# Patient Record
Sex: MALE | Race: BLACK OR AFRICAN AMERICAN | NOT HISPANIC OR LATINO | Employment: OTHER | URBAN - METROPOLITAN AREA
[De-identification: names, ages, dates, MRNs, and addresses within clinical notes are randomized per-mention and may not be internally consistent; named-entity substitution may affect disease eponyms.]

---

## 2018-06-19 ENCOUNTER — TELEPHONE (OUTPATIENT)
Dept: VASCULAR SURGERY | Facility: CLINIC | Age: 68
End: 2018-06-19

## 2018-06-19 DIAGNOSIS — I73.9 PAD (PERIPHERAL ARTERY DISEASE): Primary | ICD-10-CM

## 2018-06-19 NOTE — TELEPHONE ENCOUNTER
----- Message from Srinivas Serna sent at 6/19/2018  1:57 PM CDT -----  Contact: [facility]/sheila  [phone]//caller states that she sent a referral over for pt to see dr berger and hasnt heard anything back as of today//please call/thank you

## 2018-07-26 ENCOUNTER — TELEPHONE (OUTPATIENT)
Dept: VASCULAR SURGERY | Facility: CLINIC | Age: 68
End: 2018-07-26

## 2018-07-26 NOTE — TELEPHONE ENCOUNTER
----- Message from Robson Gonzalez sent at 7/26/2018  1:28 PM CDT -----   Pt called stating he need to speak with nurse to reschedule an appt.    Please call [phone] regarding this

## 2018-08-09 ENCOUNTER — HOSPITAL ENCOUNTER (OUTPATIENT)
Dept: VASCULAR SURGERY | Facility: CLINIC | Age: 68
Discharge: HOME | End: 2018-08-09
Attending: SURGERY
Payer: MEDICARE

## 2018-08-09 ENCOUNTER — OFFICE VISIT (OUTPATIENT)
Dept: VASCULAR SURGERY | Facility: CLINIC | Age: 68
End: 2018-08-09
Payer: MEDICARE

## 2018-08-09 VITALS
WEIGHT: 180.56 LBS | HEIGHT: 68 IN | BODY MASS INDEX: 27.36 KG/M2 | TEMPERATURE: 98 F | RESPIRATION RATE: 18 BRPM | HEART RATE: 56 BPM | SYSTOLIC BLOOD PRESSURE: 113 MMHG | DIASTOLIC BLOOD PRESSURE: 68 MMHG

## 2018-08-09 DIAGNOSIS — I82.401 ACUTE DEEP VEIN THROMBOSIS (DVT) OF RIGHT LOWER EXTREMITY, UNSPECIFIED VEIN: ICD-10-CM

## 2018-08-09 DIAGNOSIS — I87.2 VENOUS INSUFFICIENCY OF BOTH LOWER EXTREMITIES: Primary | ICD-10-CM

## 2018-08-09 DIAGNOSIS — I71.40 ABDOMINAL AORTIC ANEURYSM (AAA) WITHOUT RUPTURE: ICD-10-CM

## 2018-08-09 DIAGNOSIS — I73.9 PAD (PERIPHERAL ARTERY DISEASE): ICD-10-CM

## 2018-08-09 DIAGNOSIS — I73.9 PAD (PERIPHERAL ARTERY DISEASE): Primary | ICD-10-CM

## 2018-08-09 PROCEDURE — 99999 PR PBB SHADOW E&M-EST. PATIENT-LVL III: CPT | Mod: PBBFAC,,, | Performed by: SURGERY

## 2018-08-09 PROCEDURE — 99204 OFFICE O/P NEW MOD 45 MIN: CPT | Mod: S$PBB,,, | Performed by: SURGERY

## 2018-08-09 PROCEDURE — 93923 UPR/LXTR ART STDY 3+ LVLS: CPT | Mod: 26,S$PBB,, | Performed by: SURGERY

## 2018-08-09 PROCEDURE — 99213 OFFICE O/P EST LOW 20 MIN: CPT | Mod: PBBFAC,25 | Performed by: SURGERY

## 2018-08-09 PROCEDURE — 93923 UPR/LXTR ART STDY 3+ LVLS: CPT | Mod: PBBFAC | Performed by: SURGERY

## 2018-08-09 RX ORDER — VITAMIN A 3000 MCG
10000 CAPSULE ORAL DAILY
COMMUNITY
End: 2020-06-24

## 2018-08-09 RX ORDER — PNV NO.95/FERROUS FUM/FOLIC AC 28MG-0.8MG
1000 TABLET ORAL
COMMUNITY
End: 2020-06-24

## 2018-08-09 RX ORDER — ZINC GLUCONATE 50 MG
50 TABLET ORAL
COMMUNITY
End: 2020-06-24

## 2018-08-09 RX ORDER — LEVOTHYROXINE SODIUM 88 UG/1
TABLET ORAL
COMMUNITY
Start: 2017-01-14 | End: 2019-09-05

## 2018-08-09 NOTE — LETTER
August 9, 2018      Ines Hurt MD  [id number]  [place] [address]           [facility] - Vascular Surgery  [address]  [facility] [address]  Phone: [phone]  Fax: [phone]          Patient: Jhon Garvey Sr   MR Number: [id number]   YOB: 1950   Date of Visit: 8/9/2018       Dear Dr. Ines Hurt:    Thank you for referring Jhon Garvey Sr to me for evaluation. Attached you will find relevant portions of my assessment and plan of care.    If you have questions, please do not hesitate to call me. I look forward to following Jhon Garvey Sr along with you.    Sincerely,    John Morin MD    Enclosure  CC:  No Recipients    If you would like to receive this communication electronically, please contact [email or web address] or [phone] to request more information on [facility] access.    For providers and/or their staff who would like to refer a patient to [facility], please contact us through our one-stop-shop provider referral line, [facility], at [phone].    If you feel you have received this communication in error or would no longer like to receive these types of communications, please e-mail [email or web address]

## 2018-08-09 NOTE — PROGRESS NOTES
Jhon Garvey Sr  08/09/2018    HPI:  Patient is a 68 y.o. male with a h/o hypothyroidism, venous ulcers, DVT of R leg (?March/April 2018 - per patient at [facility]; in [place]) who is here today for evaluation of venous stasis ulcer in the L medial malleolus region.   States in June 2018 had a f/u u/s and no longer DVT.  Wound Care clinic in [place] (asked to see by Dr Ines Hurt)  The ulcer has been treated in wound clinic w/ wound vac, compression, and topical agents. These treatments have been effective, and patient reports the ulcer is nearly completely gone. Ulcer is mildly painful currently. Patient reports intermittent claudication and some mild rest pain. Patient also has some stasis dermatitis bilaterally. He also has a previous, healed ulcer on the R lateral ankle that was successfully treated by wound care team.    No history of MI/stroke  Tobacco use: Former smoker, 3 pack years. Quit in his 20s    Retired from [employer] in 2002    Past Medical History:   Diagnosis Date    Complete rotator cuff tear of left shoulder     MRI 3/11/15    Thyroid disease      Past Surgical History:   Procedure Laterality Date    SHOULDER ARTHROSCOPY  4/3/15    (L) scope w/arch decompression     Family History   Problem Relation Age of Onset    Diabetes Mother      Social History     Social History    Marital status:      Spouse name: N/A    Number of children: N/A    Years of education: N/A     Occupational History    Not on file.     Social History Main Topics    Smoking status: Never Smoker    Smokeless tobacco: Not on file    Alcohol use No    Drug use: No    Sexual activity: Not on file     Other Topics Concern    Not on file     Social History Narrative    No narrative on file       Current Outpatient Prescriptions:     levothyroxine (SYNTHROID) 88 MCG tablet, Take 88 mcg by mouth., Disp: , Rfl:     vitamin A 75144 UNIT capsule, Take 10,000 Units by mouth once daily., Disp: , Rfl:      zinc gluconate 50 mg tablet, Take 50 mg by mouth., Disp: , Rfl:     levothyroxine (SYNTHROID) 88 MCG tablet, , Disp: , Rfl:     vitamin E 1000 UNIT capsule, Take 1,000 Units by mouth., Disp: , Rfl:   -Ensure plus      REVIEW OF SYSTEMS:  General: negative; ENT: negative; Allergy and Immunology: negative; Hematological and Lymphatic: Negative; Endocrine: negative; Respiratory: no cough, shortness of breath, or wheezing; Cardiovascular: no chest pain or dyspnea on exertion; Gastrointestinal: no abdominal pain/back, change in bowel habits, or bloody stools; Genito-Urinary: no dysuria, trouble voiding, or hematuria; Musculoskeletal: negative  Neurological: no TIA or stroke symptoms; Psychiatric: no nervousness, anxiety or depression.    PHYSICAL EXAM:   Right Arm BP - Sittin/69 (18 1318)  Left Arm BP - Sittin/68 (18 1318)  Pulse: (!) 56  Temp: 97.5 °F (36.4 °C)      General appearance:  Alert, well-appearing, and in no distress.  Oriented to person, place, and time   Neurological: Normal speech, no focal findings noted; CN II - XII grossly intact           Musculoskeletal: Digits/nail without cyanosis/clubbing.  Normal muscle strength/tone.                 Neck: Supple, no significant adenopathy; thyroid is not enlarged                  No carotid bruit can be auscultated                Chest:  Clear to auscultation, no wheezes, rales or rhonchi, symmetric air entry     No use of accessory muscles             Cardiac: Normal rate and regular rhythm, S1 and S2 normal; PMI non-displaced          Abdomen: Soft, nontender, nondistended, no masses or organomegaly     No rebound tenderness noted; bowel sounds normal     Pulsatile aortic mass is palpable.     No groin adenopathy      Extremities:   2+ femoral pulses bilaterally     2+ DPs pedal pulses palpable.     L > R pre-tibial edema     L medial malleolar ~ 3-4mm ulcerations    LAB RESULTS:  No results found for: K, CREATININE  No results  found for: WBC, HCT, PLT  No results found for: HGBA1C  IMAGING:  PVRs: triphasic waveforms x2  ABIs: n/c    Previous -- none to review  DVT of R leg (?March/April 2018 - per patient at [facility]; in [place])  States in June 2018 had a f/u u/s and no longer DVT.    IMP/PLAN:  68 y.o. male with h/o hypothyroidism with Chronic venous insufficiency - CEAP C6 L leg -- -clinically has venous insufficiency  No PAD except for non-compressible  Denies previous ulcer on R leg; states he was never told he had a L leg DVT    Check evlt u/s and RTC  Still unclear why he had a R leg DVT: Needs hypercoagulable w/u  Check AAA us    John Morin MD FACS  Vascular/Endovascular Surgery

## 2018-08-30 ENCOUNTER — OFFICE VISIT (OUTPATIENT)
Dept: VASCULAR SURGERY | Facility: CLINIC | Age: 68
End: 2018-08-30
Payer: MEDICARE

## 2018-08-30 ENCOUNTER — HOSPITAL ENCOUNTER (OUTPATIENT)
Dept: VASCULAR SURGERY | Facility: CLINIC | Age: 68
Discharge: HOME | End: 2018-08-30
Attending: SURGERY
Payer: MEDICARE

## 2018-08-30 VITALS
WEIGHT: 178.56 LBS | HEART RATE: 63 BPM | SYSTOLIC BLOOD PRESSURE: 125 MMHG | DIASTOLIC BLOOD PRESSURE: 76 MMHG | TEMPERATURE: 98 F | HEIGHT: 68 IN | BODY MASS INDEX: 27.06 KG/M2

## 2018-08-30 DIAGNOSIS — I71.40 ABDOMINAL AORTIC ANEURYSM (AAA) WITHOUT RUPTURE: ICD-10-CM

## 2018-08-30 DIAGNOSIS — I87.2 VENOUS INSUFFICIENCY OF BOTH LOWER EXTREMITIES: ICD-10-CM

## 2018-08-30 DIAGNOSIS — I73.9 PAD (PERIPHERAL ARTERY DISEASE): Primary | ICD-10-CM

## 2018-08-30 DIAGNOSIS — I87.2 VENOUS INSUFFICIENCY: Primary | ICD-10-CM

## 2018-08-30 PROCEDURE — 93978 VASCULAR STUDY: CPT | Mod: PBBFAC | Performed by: SURGERY

## 2018-08-30 PROCEDURE — 99999 PR PBB SHADOW E&M-EST. PATIENT-LVL III: CPT | Mod: PBBFAC,,, | Performed by: SURGERY

## 2018-08-30 PROCEDURE — 93978 VASCULAR STUDY: CPT | Mod: 26,S$PBB,, | Performed by: SURGERY

## 2018-08-30 PROCEDURE — 99213 OFFICE O/P EST LOW 20 MIN: CPT | Mod: PBBFAC,25 | Performed by: SURGERY

## 2018-08-30 PROCEDURE — 99214 OFFICE O/P EST MOD 30 MIN: CPT | Mod: S$PBB,,, | Performed by: SURGERY

## 2018-08-30 PROCEDURE — 93970 EXTREMITY STUDY: CPT | Mod: PBBFAC | Performed by: SURGERY

## 2018-08-30 PROCEDURE — 93970 EXTREMITY STUDY: CPT | Mod: 26,S$PBB,, | Performed by: SURGERY

## 2018-08-30 RX ORDER — ALPRAZOLAM 0.5 MG/1
0.5 TABLET ORAL ONCE AS NEEDED
Qty: 2 TABLET | Refills: 0 | Status: SHIPPED | OUTPATIENT
Start: 2018-08-30 | End: 2018-08-30

## 2018-08-30 RX ORDER — MELOXICAM 7.5 MG/1
7.5 TABLET ORAL 2 TIMES DAILY
Qty: 10 TABLET | Refills: 0 | Status: SHIPPED | OUTPATIENT
Start: 2018-08-30 | End: 2018-09-04

## 2018-08-30 NOTE — PROGRESS NOTES
Jhon Garvey Sr  08/30/2018    HPI:  Patient is a 68 y.o. male with a h/o hypothyroidism, venous ulcers, DVT of R leg (?March/April 2018 - per patient at [facility]; in [place]) who is here today for f/u  I initially met him for evaluation of a venous stasis ulcer in the L medial malleolus region; at this time he was placed on warfarin for 3 months for 'blood clot' in R leg.  States in June 2018 had a f/u u/s and no longer DVT.    Wound Care clinic in [place] (asked to see by Dr Ines Hurt)  The ulcer has been treated in wound clinic w/ wound vac, compression, and topical agents.     No history of MI/stroke  Tobacco use: Former smoker, 3 pack years. Quit in his 20s    Retired from [employer] in 2002    Past Medical History:   Diagnosis Date    Complete rotator cuff tear of left shoulder     MRI 3/11/15    Thyroid disease      Past Surgical History:   Procedure Laterality Date    SHOULDER ARTHROSCOPY  4/3/15    (L) scope w/arch decompression     Family History   Problem Relation Age of Onset    Diabetes Mother      Social History     Socioeconomic History    Marital status:      Spouse name: Not on file    Number of children: Not on file    Years of education: Not on file    Highest education level: Not on file   Social Needs    Financial resource strain: Not on file    Food insecurity - worry: Not on file    Food insecurity - inability: Not on file    Transportation needs - medical: Not on file    Transportation needs - non-medical: Not on file   Occupational History    Not on file   Tobacco Use    Smoking status: Never Smoker   Substance and Sexual Activity    Alcohol use: No     Alcohol/week: 0.0 oz    Drug use: No    Sexual activity: Not on file   Other Topics Concern    Not on file   Social History Narrative    Not on file       Current Outpatient Medications:     levothyroxine (SYNTHROID) 88 MCG tablet, Take 88 mcg by mouth., Disp: , Rfl:     levothyroxine (SYNTHROID)  88 MCG tablet, , Disp: , Rfl:     vitamin A 78994 UNIT capsule, Take 10,000 Units by mouth once daily., Disp: , Rfl:     vitamin E 1000 UNIT capsule, Take 1,000 Units by mouth., Disp: , Rfl:     zinc gluconate 50 mg tablet, Take 50 mg by mouth., Disp: , Rfl:   -Ensure plus      REVIEW OF SYSTEMS:  General: negative; ENT: negative; Allergy and Immunology: negative; Hematological and Lymphatic: Negative; Endocrine: negative; Respiratory: no cough, shortness of breath, or wheezing; Cardiovascular: no chest pain or dyspnea on exertion; Gastrointestinal: no abdominal pain/back, change in bowel habits, or bloody stools; Genito-Urinary: no dysuria, trouble voiding, or hematuria; Musculoskeletal: negative  Neurological: no TIA or stroke symptoms; Psychiatric: no nervousness, anxiety or depression.    PHYSICAL EXAM:   Vitals:    08/30/18 1311   BP: 125/76   Pulse: 63   Temp: 98.4 °F (36.9 °C)       General appearance:  Alert, well-appearing, and in no distress.  Oriented to person, place, and time   Neurological: Normal speech, no focal findings noted; CN II - XII grossly intact           Musculoskeletal: Digits/nail without cyanosis/clubbing.  Normal muscle strength/tone.                 Neck: Supple, no significant adenopathy; thyroid is not enlarged                  No carotid bruit can be auscultated                Chest:  Clear to auscultation, no wheezes, rales or rhonchi, symmetric air entry     No use of accessory muscles             Cardiac: Normal rate and regular rhythm, S1 and S2 normal; PMI non-displaced          Abdomen: Soft, nontender, nondistended, no masses or organomegaly     No rebound tenderness noted; bowel sounds normal     Pulsatile aortic mass is palpable.     No groin adenopathy      Extremities:   2+ femoral pulses bilaterally     2+ DPs pedal pulses palpable.     L > R pre-tibial edema     L medial malleolar ~ 3-4mm ulcerations    LAB RESULTS:  Lab Results:  No results found for: K,  CREATININE  No results found for: WBC, HCT, PLT  No results found for: HGBA1C    IMAGING:  Aortic u/s: 2.6cm aorta    evlt u/s:  R GSV 5.8mm  L GSV 5.9mm  No LSV reflux    No DVT    PVRs: triphasic waveforms x2  ABIs: n/c    Previous -- none to review  DVT of R leg (?March/April 2018 - per patient at [facility]; in [place])  States in June 2018 had a f/u u/s and no longer DVT.    IMP/PLAN:  68 y.o. male with h/o hypothyroidism with Chronic venous insufficiency - CEAP C6 L leg -- -clinically has venous insufficiency  No PAD except for non-compressible; no AAA  Denies previous ulcer on R leg; states he was never told he had a L leg DVT only a 'clot' on R leg; still unclear why he had a R leg DVT: Hypercoagulable w/u was negative  Think to prevent recurrence of ulceration, L GSV evlt indicated  States he has 'low WBCs;' will check CBC and BMP    John Morin MD FACS  Vascular/Endovascular Surgery

## 2018-09-18 DIAGNOSIS — I87.2 VENOUS INSUFFICIENCY OF BOTH LOWER EXTREMITIES: Primary | ICD-10-CM

## 2018-09-18 DIAGNOSIS — I87.2 VENOUS INSUFFICIENCY OF LEFT LEG: Primary | ICD-10-CM

## 2018-09-19 ENCOUNTER — PROCEDURE VISIT (OUTPATIENT)
Dept: VASCULAR SURGERY | Facility: CLINIC | Age: 68
End: 2018-09-19
Payer: MEDICARE

## 2018-09-19 VITALS
DIASTOLIC BLOOD PRESSURE: 74 MMHG | HEIGHT: 68 IN | WEIGHT: 178.56 LBS | HEART RATE: 67 BPM | TEMPERATURE: 98 F | SYSTOLIC BLOOD PRESSURE: 120 MMHG | RESPIRATION RATE: 20 BRPM | BODY MASS INDEX: 27.06 KG/M2

## 2018-09-19 DIAGNOSIS — I87.2 VENOUS INSUFFICIENCY (CHRONIC) (PERIPHERAL): ICD-10-CM

## 2018-09-19 DIAGNOSIS — I87.2 VENOUS INSUFFICIENCY: ICD-10-CM

## 2018-09-19 DIAGNOSIS — I71.40 ABDOMINAL AORTIC ANEURYSM (AAA) WITHOUT RUPTURE: Primary | ICD-10-CM

## 2018-09-19 DIAGNOSIS — I87.2 VENOUS INSUFFICIENCY OF BOTH LOWER EXTREMITIES: ICD-10-CM

## 2018-09-19 PROCEDURE — 36478 ENDOVENOUS LASER 1ST VEIN: CPT | Mod: S$PBB,LT,, | Performed by: SURGERY

## 2018-09-19 PROCEDURE — 36478 ENDOVENOUS LASER 1ST VEIN: CPT | Mod: PBBFAC,LT | Performed by: SURGERY

## 2018-09-19 NOTE — PROCEDURES
Jhon LEONARD Guru     09/19/2018    Pre-Procedure Diagnosis: Left greater saphenous vein reflux; significant superficial venous insufficiency    Post-Procedure Diagnsosis: Same    Procedure:  Laser endovenous ablation of the left greater saphenous vein    Surgeon: John Morin MD [facility]     Anesthesia: Local    The skin of the leg was prepped and draped in sterile fashion.  Ultrasound-guidance was used throughout the procedure with a portable duplex ultrasound machine.  The GSV was cannulated below the knee using a micro-puncture system.  An 0.035 wire J-tip followed by a 4-Fr Angiodynamics sheath was placed throughout the left GSV.   Using tumescent anesthesia, the entire sheathed portion of the GSV was anesthesized keeping the vein at least one cm from the skin; Klein pump was used.  Position of the tip of the laser was then reconfirmed to be approximately 2 cm from the saphenofemoral junction.  The 1470 nm laser was then activated and the entire length of the vein was treated at ~ 49 J/cm.  The fiber and sheath were then removed intact.  Duplex confirmed occlusion of the GSV with continued patency of the common femoral vein.   The incision was closed with Steri-strips.   Sterile compression dressings and a compression stocking were applied and the patient was discharged to home in a satisfactory condition.    Cannulation site: Below-the-knee    Sheath length: 46 cm    Coates of power: 7 W    Laser time: 309.7 sec    Joules: 2155.3 J           John Morin MD [facility]   Vascular & Endovascular Surgery

## 2018-09-26 ENCOUNTER — OFFICE VISIT (OUTPATIENT)
Dept: VASCULAR SURGERY | Facility: CLINIC | Age: 68
End: 2018-09-26
Payer: MEDICARE

## 2018-09-26 ENCOUNTER — HOSPITAL ENCOUNTER (OUTPATIENT)
Dept: VASCULAR SURGERY | Facility: CLINIC | Age: 68
Discharge: HOME | End: 2018-09-26
Attending: SURGERY
Payer: MEDICARE

## 2018-09-26 VITALS
HEIGHT: 68 IN | WEIGHT: 178.56 LBS | TEMPERATURE: 98 F | BODY MASS INDEX: 27.06 KG/M2 | SYSTOLIC BLOOD PRESSURE: 143 MMHG | HEART RATE: 66 BPM | DIASTOLIC BLOOD PRESSURE: 84 MMHG

## 2018-09-26 DIAGNOSIS — I87.2 VENOUS INSUFFICIENCY OF LEFT LEG: Primary | ICD-10-CM

## 2018-09-26 DIAGNOSIS — I87.2 VENOUS INSUFFICIENCY OF LEFT LEG: ICD-10-CM

## 2018-09-26 PROCEDURE — 99999 PR PBB SHADOW E&M-EST. PATIENT-LVL III: CPT | Mod: PBBFAC,,, | Performed by: SURGERY

## 2018-09-26 PROCEDURE — 93971 EXTREMITY STUDY: CPT | Mod: 26,S$PBB,, | Performed by: SURGERY

## 2018-09-26 PROCEDURE — 99214 OFFICE O/P EST MOD 30 MIN: CPT | Mod: S$PBB,,, | Performed by: SURGERY

## 2018-09-26 PROCEDURE — 93971 EXTREMITY STUDY: CPT | Mod: PBBFAC | Performed by: SURGERY

## 2018-09-26 PROCEDURE — 99213 OFFICE O/P EST LOW 20 MIN: CPT | Mod: PBBFAC,25 | Performed by: SURGERY

## 2018-09-26 RX ORDER — MELOXICAM 7.5 MG/1
7.5 TABLET ORAL 2 TIMES DAILY
Qty: 10 TABLET | Refills: 0 | Status: SHIPPED | OUTPATIENT
Start: 2018-09-26 | End: 2018-10-01

## 2018-09-26 RX ORDER — ALPRAZOLAM 0.5 MG/1
0.5 TABLET ORAL ONCE AS NEEDED
Qty: 2 TABLET | Refills: 0 | Status: SHIPPED | OUTPATIENT
Start: 2018-09-26 | End: 2020-07-27

## 2018-09-26 NOTE — PROGRESS NOTES
Jhon Garvey Sr  09/26/2018    HPI:  Patient is a 68 y.o. male with a h/o hypothyroidism, venous ulcers, DVT of R leg (?March/April 2018 - per patient at [facility]; in [place]) who is here today for f/u. Doing well since L GS evlt - L lower edema and pain is resolved  I initially met him for evaluation of a venous stasis ulcer in the L medial malleolus region; at this time he was placed on warfarin for 3 months for 'blood clot' in R leg.  States in June 2018 had a f/u u/s and no longer DVT.    Wound Care clinic in [place] (asked to see by Dr Ines Hurt)  The ulcer has been treated in wound clinic w/ wound vac, compression, and topical agents.     S/p  9/19/18: L GSV evlt 46cm    No history of MI/stroke  Tobacco use: Former smoker, 3 pack years. Quit in his 20s    Retired from [employer] in 2002    Past Medical History:   Diagnosis Date    Complete rotator cuff tear of left shoulder     MRI 3/11/15    Thyroid disease      Past Surgical History:   Procedure Laterality Date    SHOULDER ARTHROSCOPY  4/3/15    (L) scope w/arch decompression     Family History   Problem Relation Age of Onset    Diabetes Mother      Social History     Socioeconomic History    Marital status:      Spouse name: Not on file    Number of children: Not on file    Years of education: Not on file    Highest education level: Not on file   Social Needs    Financial resource strain: Not on file    Food insecurity - worry: Not on file    Food insecurity - inability: Not on file    Transportation needs - medical: Not on file    Transportation needs - non-medical: Not on file   Occupational History    Not on file   Tobacco Use    Smoking status: Never Smoker   Substance and Sexual Activity    Alcohol use: No     Alcohol/week: 0.0 oz    Drug use: No    Sexual activity: Not on file   Other Topics Concern    Not on file   Social History Narrative    Not on file       Current Outpatient Medications:      levothyroxine (SYNTHROID) 88 MCG tablet, Take 88 mcg by mouth., Disp: , Rfl:     levothyroxine (SYNTHROID) 88 MCG tablet, , Disp: , Rfl:     vitamin A 54223 UNIT capsule, Take 10,000 Units by mouth once daily., Disp: , Rfl:     vitamin E 1000 UNIT capsule, Take 1,000 Units by mouth., Disp: , Rfl:     zinc gluconate 50 mg tablet, Take 50 mg by mouth., Disp: , Rfl:     ALPRAZolam (XANAX) 0.5 MG tablet, Take 1 tablet (0.5 mg total) by mouth once as needed for Anxiety. Take one 0.5 mg tablet of xanax 1h before the EVLT procedure.  You may take a second tablet right before the procedure; please check with our nurse., Disp: 2 tablet, Rfl: 0    Current Facility-Administered Medications:     lidocaine-EPINEPHrine 1%-1:100,000 30 mL, lidocaine HCL 10 mg/ml (1%) 20 mL, sodium bicarbonate 1 mEq/mL (8.4 %) 10 mL in sodium chloride 0.9% 500 mL solution, , MISCELLANEOUS, Once, John Morin MD  -Ensure plus      REVIEW OF SYSTEMS:  General: negative; ENT: negative; Allergy and Immunology: negative; Hematological and Lymphatic: Negative; Endocrine: negative; Respiratory: no cough, shortness of breath, or wheezing; Cardiovascular: no chest pain or dyspnea on exertion; Gastrointestinal: no abdominal pain/back, change in bowel habits, or bloody stools; Genito-Urinary: no dysuria, trouble voiding, or hematuria; Musculoskeletal: negative  Neurological: no TIA or stroke symptoms; Psychiatric: no nervousness, anxiety or depression.    PHYSICAL EXAM:   Vitals:    09/26/18 0933   BP: (!) 143/84   Pulse: 66   Temp: 97.8 °F (36.6 °C)       General appearance:  Alert, well-appearing, and in no distress.  Oriented to person, place, and time   Neurological: Normal speech, no focal findings noted; CN II - XII grossly intact           Musculoskeletal: Digits/nail without cyanosis/clubbing.  Normal muscle strength/tone.                 Neck: Supple, no significant adenopathy; thyroid is not enlarged                  No carotid bruit can  be auscultated                Chest:  Clear to auscultation, no wheezes, rales or rhonchi, symmetric air entry     No use of accessory muscles             Cardiac: Normal rate and regular rhythm, S1 and S2 normal; PMI non-displaced          Abdomen: Soft, nontender, nondistended, no masses or organomegaly     No rebound tenderness noted; bowel sounds normal     Pulsatile aortic mass is palpable.     No groin adenopathy      Extremities:   2+ femoral pulses bilaterally     2+ DPs pedal pulses palpable.     Now no L pre-tibial edema     Healed L leg ulcer (previous: L medial malleolar ~ 3-4mm ulcerations)       Some R leg edema and discoloration; small R lateral ulceraton 1mm since 2017 - scab    LAB RESULTS:  Lab Results:  No results found for: K, CREATININE  No results found for: WBC, HCT, PLT  No results found for: HGBA1C    IMAGING:  Aortic u/s: 2.6cm aorta    evlt u/s:  R GSV 5.8mm  L GSV 5.9mm  No LSV reflux    No DVT    PVRs: triphasic waveforms x2  ABIs: n/c    Previous -- none to review  DVT of R leg (?March/April 2018 - per patient at [facility]; in [place])  States in June 2018 had a f/u u/s and no longer DVT.    IMP/PLAN:  68 y.o. male with h/o hypothyroidism with Chronic venous insufficiency - CEAP C6 R leg -- -clinically has venous insufficiency; CEAP C5 L leg  No PAD except for non-compressible; no AAA  Has small healing 1mm R lateral ankle ulcer on R leg; states he was never told he had a L leg DVT only a 'clot' on R leg; still unclear why he had a R leg DVT: Hypercoagulable w/u was negative  Doing well from recent L GSV evlt    To Rx and prevent recurrence of ulceration of R leg, R GSV evlt indicated  States he has 'low WBCs;' will check CBC and BMP    John Morin MD FACS  Vascular/Endovascular Surgery

## 2018-10-16 DIAGNOSIS — I87.2 VENOUS INSUFFICIENCY OF BOTH LOWER EXTREMITIES: Primary | ICD-10-CM

## 2018-10-17 ENCOUNTER — PROCEDURE VISIT (OUTPATIENT)
Dept: VASCULAR SURGERY | Facility: CLINIC | Age: 68
End: 2018-10-17
Payer: MEDICARE

## 2018-10-17 VITALS
TEMPERATURE: 98 F | BODY MASS INDEX: 27.17 KG/M2 | HEIGHT: 68 IN | WEIGHT: 179.25 LBS | RESPIRATION RATE: 18 BRPM | DIASTOLIC BLOOD PRESSURE: 77 MMHG | HEART RATE: 62 BPM | SYSTOLIC BLOOD PRESSURE: 124 MMHG

## 2018-10-17 DIAGNOSIS — I87.2 VENOUS INSUFFICIENCY OF LEFT LEG: ICD-10-CM

## 2018-10-17 DIAGNOSIS — I87.2 VENOUS INSUFFICIENCY OF RIGHT LEG: Primary | ICD-10-CM

## 2018-10-17 DIAGNOSIS — I87.2 VENOUS INSUFFICIENCY (CHRONIC) (PERIPHERAL): Primary | ICD-10-CM

## 2018-10-17 PROCEDURE — 36478 ENDOVENOUS LASER 1ST VEIN: CPT | Mod: PBBFAC,RT | Performed by: SURGERY

## 2018-10-17 PROCEDURE — 36478 ENDOVENOUS LASER 1ST VEIN: CPT | Mod: S$PBB,RT,, | Performed by: SURGERY

## 2018-10-17 NOTE — PROCEDURES
Jhon LEONARD Guru     10/17/2018    Pre-Procedure Diagnosis: Right greater saphenous vein reflux; significant superficial venous insufficiency    Post-Procedure Diagnsosis: Same    Procedure: Laser endovenous ablation of the right greater saphenous vein    Surgeon: John Morin MD FACS     Anesthesia: Local    The skin of the leg was prepped and draped in sterile fashion.  Ultrasound-guidance was used throughout the procedure with a portable duplex ultrasound machine.  The right GSV was cannulated below the knee using a micro-puncture system.  An 0.035 wire J-tip followed by a 4-Fr Angiodynamics sheath was placed throughout the right GSV.   Using tumescent anesthesia, the entire sheathed portion of the GSV was anesthesized keeping the vein at least one cm from the skin; Klein pump was used.  Position of the tip of the laser was then reconfirmed to be approximately 2 cm from the saphenofemoral junction.  The 1470 nm laser was then activated and the entire length of the vein was treated at ~ 49 J/cm.  The fiber and sheath were then removed intact.  Duplex confirmed occlusion of the GSV with continued patency of the common femoral vein.   The incision was closed with Steri-strips.   Sterile compression dressings and a compression stocking were applied and the patient was discharged to home in a satisfactory condition.    Cannulation site: Below-the-knee    Sheath length: 10cm below-knee and 9cm above-knee    Coates of power: 7 W    Laser time: 64.6, 129.8 sec    Joules: 452.2, 908.6 J             John Morin MD FACS   Vascular & Endovascular Surgery        
SHELBI Cabrera

## 2018-10-25 ENCOUNTER — OFFICE VISIT (OUTPATIENT)
Dept: VASCULAR SURGERY | Facility: CLINIC | Age: 68
End: 2018-10-25
Payer: MEDICARE

## 2018-10-25 ENCOUNTER — HOSPITAL ENCOUNTER (OUTPATIENT)
Dept: VASCULAR SURGERY | Facility: CLINIC | Age: 68
Discharge: HOME | End: 2018-10-25
Attending: SURGERY
Payer: MEDICARE

## 2018-10-25 VITALS
BODY MASS INDEX: 27.23 KG/M2 | DIASTOLIC BLOOD PRESSURE: 77 MMHG | HEIGHT: 68 IN | RESPIRATION RATE: 18 BRPM | HEART RATE: 64 BPM | TEMPERATURE: 98 F | SYSTOLIC BLOOD PRESSURE: 116 MMHG | WEIGHT: 179.69 LBS

## 2018-10-25 DIAGNOSIS — I87.2 VENOUS INSUFFICIENCY OF RIGHT LEG: ICD-10-CM

## 2018-10-25 DIAGNOSIS — I87.2 VENOUS INSUFFICIENCY OF RIGHT LEG: Primary | ICD-10-CM

## 2018-10-25 PROCEDURE — 93971 EXTREMITY STUDY: CPT | Mod: 26,S$PBB,, | Performed by: SURGERY

## 2018-10-25 PROCEDURE — 99999 PR PBB SHADOW E&M-EST. PATIENT-LVL III: CPT | Mod: PBBFAC,,, | Performed by: SURGERY

## 2018-10-25 PROCEDURE — 93971 EXTREMITY STUDY: CPT | Mod: PBBFAC | Performed by: SURGERY

## 2018-10-25 PROCEDURE — 99214 OFFICE O/P EST MOD 30 MIN: CPT | Mod: S$PBB,,, | Performed by: SURGERY

## 2018-10-25 PROCEDURE — 99213 OFFICE O/P EST LOW 20 MIN: CPT | Mod: PBBFAC,25 | Performed by: SURGERY

## 2018-10-25 NOTE — PROGRESS NOTES
Jhon Garvey Sr  10/25/2018    HPI:  Patient is a 68 y.o. male with a h/o hypothyroidism, venous ulcers, DVT of R leg (?March/April 2018 - per patient at [facility]; in [place]) who is here today for f/u. Doing well since L GS evlt - L lower edema and pain is resolved  I initially met him for evaluation of a venous stasis ulcer in the L medial malleolus region; at this time he was placed on warfarin for 3 months for 'blood clot' in R leg.  States in June 2018 had a f/u u/s and no longer DVT.    Wound Care clinic in [place] (asked to see by Dr Ines Hurt)  The ulcer has been treated in wound clinic w/ wound vac, compression, and topical agents.     S/p  9/19/18: L GSV evlt 46cm  10/17/18: R GSV evlt 10cm ak- and 9 cm bk- access    No history of MI/stroke  Tobacco use: Former smoker, 3 pack years. Quit in his 20s    Retired from [employer] in 2002    Past Medical History:   Diagnosis Date    Complete rotator cuff tear of left shoulder     MRI 3/11/15    Thyroid disease      Past Surgical History:   Procedure Laterality Date    SHOULDER ARTHROSCOPY  4/3/15    (L) scope w/arch decompression     Family History   Problem Relation Age of Onset    Diabetes Mother      Social History     Socioeconomic History    Marital status:      Spouse name: Not on file    Number of children: Not on file    Years of education: Not on file    Highest education level: Not on file   Social Needs    Financial resource strain: Not on file    Food insecurity - worry: Not on file    Food insecurity - inability: Not on file    Transportation needs - medical: Not on file    Transportation needs - non-medical: Not on file   Occupational History    Not on file   Tobacco Use    Smoking status: Never Smoker   Substance and Sexual Activity    Alcohol use: No     Alcohol/week: 0.0 oz    Drug use: No    Sexual activity: Not on file   Other Topics Concern    Not on file   Social History Narrative    Not on file        Current Outpatient Medications:     ALPRAZolam (XANAX) 0.5 MG tablet, Take 1 tablet (0.5 mg total) by mouth once as needed for Anxiety. Take one 0.5 mg tablet of xanax 1h before the EVLT procedure.  You may take a second tablet right before the procedure; please check with our nurse., Disp: 2 tablet, Rfl: 0    levothyroxine (SYNTHROID) 88 MCG tablet, Take 88 mcg by mouth., Disp: , Rfl:     levothyroxine (SYNTHROID) 88 MCG tablet, , Disp: , Rfl:     vitamin A 01247 UNIT capsule, Take 10,000 Units by mouth once daily., Disp: , Rfl:     vitamin E 1000 UNIT capsule, Take 1,000 Units by mouth., Disp: , Rfl:     zinc gluconate 50 mg tablet, Take 50 mg by mouth., Disp: , Rfl:     Current Facility-Administered Medications:     lidocaine-EPINEPHrine 1%-1:100,000 30 mL, lidocaine HCL 10 mg/ml (1%) 20 mL, sodium bicarbonate 1 mEq/mL (8.4 %) 10 mL in sodium chloride 0.9% 500 mL solution, , MISCELLANEOUS, Once, John Morin MD    lidocaine-EPINEPHrine 1%-1:100,000 30 mL, lidocaine HCL 10 mg/ml (1%) 20 mL, sodium bicarbonate 1 mEq/mL (8.4 %) 10 mL in sodium chloride 0.9% 500 mL solution, , MISCELLANEOUS, Once, John Morin MD  -Ensure plus      REVIEW OF SYSTEMS:  General: negative; ENT: negative; Allergy and Immunology: negative; Hematological and Lymphatic: Negative; Endocrine: negative; Respiratory: no cough, shortness of breath, or wheezing; Cardiovascular: no chest pain or dyspnea on exertion; Gastrointestinal: no abdominal pain/back, change in bowel habits, or bloody stools; Genito-Urinary: no dysuria, trouble voiding, or hematuria; Musculoskeletal: negative  Neurological: no TIA or stroke symptoms; Psychiatric: no nervousness, anxiety or depression.    PHYSICAL EXAM:   Vitals:    10/25/18 1332   BP: 116/77   Pulse: 64   Resp: 18   Temp: 98.2 °F (36.8 °C)       General appearance:  Alert, well-appearing, and in no distress.  Oriented to person, place, and time   Neurological: Normal speech, no focal  findings noted; CN II - XII grossly intact           Musculoskeletal: Digits/nail without cyanosis/clubbing.  Normal muscle strength/tone.                 Neck: Supple, no significant adenopathy; thyroid is not enlarged                  No carotid bruit can be auscultated                Chest:  Clear to auscultation, no wheezes, rales or rhonchi, symmetric air entry     No use of accessory muscles             Cardiac: Normal rate and regular rhythm, S1 and S2 normal; PMI non-displaced          Abdomen: Soft, nontender, nondistended, no masses or organomegaly     No rebound tenderness noted; bowel sounds normal     Pulsatile aortic mass is palpable.     No groin adenopathy      Extremities:   2+ femoral pulses bilaterally     2+ DPs pedal pulses palpable.     Now no L pre-tibial edema     Healed L leg ulcer (previous: L medial malleolar ~ 3-4mm ulcerations)       Some R leg edema and discoloration; small R lateral ulceraton 1mm since 2017 - scab    LAB RESULTS:  Lab Results:  No results found for: K, CREATININE  No results found for: WBC, HCT, PLT  No results found for: HGBA1C    IMAGING:  U/S: kalin GSVs are closed    Previous:  Aortic u/s: 2.6cm aorta    evlt u/s:  R GSV 5.8mm  L GSV 5.9mm  No LSV reflux    No DVT    PVRs: triphasic waveforms x2  ABIs: n/c    Previous -- none to review  DVT of R leg (?March/April 2018 - per patient at [facility]; in [place])  States in June 2018 had a f/u u/s and no longer DVT.    IMP/PLAN:  68 y.o. male with h/o hypothyroidism with Chronic venous insufficiency - CEAP C6 R leg -- -clinically has venous insufficiency; CEAP C5 L leg  No PAD except for non-compressible; no AAA  Hypercoagulable w/u was negative    Doing well from recent L GSV evlt and R GSV elvt  To Rx and prevent recurrence of ulceration of R leg, R GSV evlt was indicated - the ulcers are now healed.  Follow-up with me in 1yr with evlt u/s    John Morin MD FACS  Vascular/Endovascular Surgery

## 2019-07-17 ENCOUNTER — TELEPHONE (OUTPATIENT)
Dept: VASCULAR SURGERY | Facility: CLINIC | Age: 69
End: 2019-07-17

## 2019-07-17 DIAGNOSIS — I87.2 VENOUS INSUFFICIENCY: Primary | ICD-10-CM

## 2019-07-17 NOTE — TELEPHONE ENCOUNTER
----- Message from Willow Quintanilla sent at 7/17/2019 11:33 AM CDT -----  Contact: Pt.Self   Patient Requesting Sooner Appointment.     Reason for sooner appt.:  Follow-up with me in 1yr with marii pollack u/s    When is the first available appointment?  schedule not available @ time of call     Communication Preference:  [phone]    Additional Information:  Pt. States he would like to be seen sooner due to sensitive area inside right ankle     Thank You

## 2019-09-05 ENCOUNTER — OFFICE VISIT (OUTPATIENT)
Dept: VASCULAR SURGERY | Facility: CLINIC | Age: 69
End: 2019-09-05
Payer: MEDICARE

## 2019-09-05 ENCOUNTER — HOSPITAL ENCOUNTER (OUTPATIENT)
Dept: VASCULAR SURGERY | Facility: CLINIC | Age: 69
Discharge: HOME | End: 2019-09-05
Attending: SURGERY
Payer: MEDICARE

## 2019-09-05 VITALS
TEMPERATURE: 98 F | BODY MASS INDEX: 26.4 KG/M2 | WEIGHT: 174.19 LBS | SYSTOLIC BLOOD PRESSURE: 119 MMHG | DIASTOLIC BLOOD PRESSURE: 74 MMHG | HEIGHT: 68 IN | HEART RATE: 56 BPM

## 2019-09-05 DIAGNOSIS — I87.2 VENOUS INSUFFICIENCY: Primary | ICD-10-CM

## 2019-09-05 DIAGNOSIS — I87.2 VENOUS INSUFFICIENCY: ICD-10-CM

## 2019-09-05 PROCEDURE — 99999 PR PBB SHADOW E&M-EST. PATIENT-LVL III: ICD-10-PCS | Mod: PBBFAC,,, | Performed by: SURGERY

## 2019-09-05 PROCEDURE — 99214 OFFICE O/P EST MOD 30 MIN: CPT | Mod: S$PBB,,, | Performed by: SURGERY

## 2019-09-05 PROCEDURE — 93970 EXTREMITY STUDY: CPT | Mod: PBBFAC | Performed by: SURGERY

## 2019-09-05 PROCEDURE — 99214 PR OFFICE/OUTPT VISIT, EST, LEVL IV, 30-39 MIN: ICD-10-PCS | Mod: S$PBB,,, | Performed by: SURGERY

## 2019-09-05 PROCEDURE — 99999 PR PBB SHADOW E&M-EST. PATIENT-LVL III: CPT | Mod: PBBFAC,,, | Performed by: SURGERY

## 2019-09-05 PROCEDURE — 93970 PR US DUPLEX, UPPER OR LOWER EXT VENOUS,COMPLETE BILAT: ICD-10-PCS | Mod: 26,S$PBB,, | Performed by: SURGERY

## 2019-09-05 PROCEDURE — 99213 OFFICE O/P EST LOW 20 MIN: CPT | Mod: PBBFAC,25 | Performed by: SURGERY

## 2019-09-05 PROCEDURE — 93970 EXTREMITY STUDY: CPT | Mod: 26,S$PBB,, | Performed by: SURGERY

## 2019-09-05 RX ORDER — LEVOTHYROXINE SODIUM 88 UG/1
TABLET ORAL
COMMUNITY
Start: 2019-05-06

## 2019-09-05 NOTE — PROGRESS NOTES
Jhon Garvey Sr  09/05/2019    HPI:  Patient is a 69 y.o. male with a h/o hypothyroidism, venous ulcers, DVT of R leg (?March/April 2018 - per patient at [facility]; in [place]) who is here today for f/u. Doing well since L GS evlt - L lower edema and pain is resolved  I initially met him for evaluation of a venous stasis ulcer in the L medial malleolus region; at this time he was placed on warfarin for 3 months for 'blood clot' in R leg.  States in June 2018 had a f/u u/s and no longer DVT.    Wound Care clinic in [place] (asked to see by Dr Ines Hurt)  The ulcer has been treated in wound clinic w/ wound vac, compression, and topical agents.     S/p  9/19/18: L GSV evlt 46cm  10/17/18: R GSV evlt 10cm ak- and 9 cm bk- access    No history of MI/stroke  Tobacco use: Former smoker, 3 pack years. Quit in his 20s    Retired from [employer] in 2002    Past Medical History:   Diagnosis Date    Complete rotator cuff tear of left shoulder     MRI 3/11/15    Thyroid disease      Past Surgical History:   Procedure Laterality Date    SHOULDER ARTHROSCOPY  4/3/15    (L) scope w/arch decompression     Family History   Problem Relation Age of Onset    Diabetes Mother      Social History     Socioeconomic History    Marital status:      Spouse name: Not on file    Number of children: Not on file    Years of education: Not on file    Highest education level: Not on file   Occupational History    Not on file   Social Needs    Financial resource strain: Not on file    Food insecurity:     Worry: Not on file     Inability: Not on file    Transportation needs:     Medical: Not on file     Non-medical: Not on file   Tobacco Use    Smoking status: Never Smoker   Substance and Sexual Activity    Alcohol use: No     Alcohol/week: 0.0 oz    Drug use: No    Sexual activity: Not on file   Lifestyle    Physical activity:     Days per week: Not on file     Minutes per session: Not on file    Stress: Not  on file   Relationships    Social connections:     Talks on phone: Not on file     Gets together: Not on file     Attends [employer] service: Not on file     Active member of club or organization: Not on file     Attends meetings of clubs or organizations: Not on file     Relationship status: Not on file   Other Topics Concern    Not on file   Social History Narrative    Not on file       Current Outpatient Medications:     ALPRAZolam (XANAX) 0.5 MG tablet, Take 1 tablet (0.5 mg total) by mouth once as needed for Anxiety. Take one 0.5 mg tablet of xanax 1h before the EVLT procedure.  You may take a second tablet right before the procedure; please check with our nurse., Disp: 2 tablet, Rfl: 0    levothyroxine (SYNTHROID) 88 MCG tablet, Take 88 mcg by mouth., Disp: , Rfl:     levothyroxine (SYNTHROID) 88 MCG tablet, , Disp: , Rfl:     vitamin A 66173 UNIT capsule, Take 10,000 Units by mouth once daily., Disp: , Rfl:     vitamin E 1000 UNIT capsule, Take 1,000 Units by mouth., Disp: , Rfl:     zinc gluconate 50 mg tablet, Take 50 mg by mouth., Disp: , Rfl:     Current Facility-Administered Medications:     lidocaine-EPINEPHrine 1%-1:100,000 30 mL, lidocaine HCL 10 mg/ml (1%) 20 mL, sodium bicarbonate 1 mEq/mL (8.4 %) 10 mL in sodium chloride 0.9% 500 mL solution, , MISCELLANEOUS, Once, John Morin MD    lidocaine-EPINEPHrine 1%-1:100,000 30 mL, lidocaine HCL 10 mg/ml (1%) 20 mL, sodium bicarbonate 1 mEq/mL (8.4 %) 10 mL in sodium chloride 0.9% 500 mL solution, , MISCELLANEOUS, Once, John Morin MD  -Ensure plus      REVIEW OF SYSTEMS:  General: negative; ENT: negative; Allergy and Immunology: negative; Hematological and Lymphatic: Negative; Endocrine: negative; Respiratory: no cough, shortness of breath, or wheezing; Cardiovascular: no chest pain or dyspnea on exertion; Gastrointestinal: no abdominal pain/back, change in bowel habits, or bloody stools; Genito-Urinary: no dysuria, trouble voiding, or  hematuria; Musculoskeletal: negative  Neurological: no TIA or stroke symptoms; Psychiatric: no nervousness, anxiety or depression.    PHYSICAL EXAM:   Vitals:    09/05/19 1538   BP: 119/74   Pulse: (!) 56   Temp: 97.8 °F (36.6 °C)       General appearance:  Alert, well-appearing, and in no distress.  Oriented to person, place, and time   Neurological: Normal speech, no focal findings noted; CN II - XII grossly intact           Musculoskeletal: Digits/nail without cyanosis/clubbing.  Normal muscle strength/tone.                 Neck: Supple, no significant adenopathy; thyroid is not enlarged                  No carotid bruit can be auscultated                Chest:  Clear to auscultation, no wheezes, rales or rhonchi, symmetric air entry     No use of accessory muscles             Cardiac: Normal rate and regular rhythm, S1 and S2 normal; PMI non-displaced          Abdomen: Soft, nontender, nondistended, no masses or organomegaly     No rebound tenderness noted; bowel sounds normal     Pulsatile aortic mass is palpable.     No groin adenopathy      Extremities:   2+ femoral pulses bilaterally     2+ DPs pedal pulses palpable.     Now no L pre-tibial edema     Healed L leg ulcer (previous: L medial malleolar ~ 3-4mm ulcerations)       Some R leg edema and discoloration; small R lateral ulceraton 1mm since 2017 - scab    LAB RESULTS:  Lab Results:  No results found for: K, CREATININE  No results found for: WBC, HCT, PLT  No results found for: HGBA1C    IMAGING:  U/S: L GSV is closed  R GSV is partially recanalized but no reflux    Previous:  Aortic u/s: 2.6cm aorta    evlt u/s:  R GSV 5.8mm  L GSV 5.9mm  No LSV reflux    No DVT    PVRs: triphasic waveforms x2  ABIs: n/c    Previous -- none to review  DVT of R leg (?March/April 2018 - per patient at [facility]; in [place])  States in June 2018 had a f/u u/s and no longer DVT.    IMP/PLAN:  69 y.o. male with h/o hypothyroidism with Chronic venous insufficiency -  CEAP C6 R leg -- -clinically has venous insufficiency; CEAP C5 L leg  No PAD except for non-compressible; no AAA  Hypercoagulable w/u was negative    Doing well from recent L GSV evlt and R GSV elvt  To Rx and prevent recurrence of ulceration of R leg, R GSV evlt was indicated - the ulcers are now healed.  Doing well; D/C clinic    John Morin MD FACS  Vascular/Endovascular Surgery

## 2020-07-13 PROBLEM — R60.0 BILATERAL LEG EDEMA: Status: ACTIVE | Noted: 2020-07-13

## 2020-07-13 PROBLEM — I83.892 VENOUS STASIS ULCER OF LEFT LOWER LEG WITH EDEMA OF LEFT LOWER LEG: Status: ACTIVE | Noted: 2020-07-13

## 2020-07-13 PROBLEM — I83.019 VENOUS STASIS ULCER OF RIGHT LOWER LEG WITH EDEMA OF RIGHT LOWER LEG: Status: ACTIVE | Noted: 2020-07-13

## 2020-07-13 PROBLEM — L97.929 VENOUS STASIS ULCER OF LEFT LOWER LEG WITH EDEMA OF LEFT LOWER LEG: Status: ACTIVE | Noted: 2020-07-13

## 2020-07-13 PROBLEM — L97.919 VENOUS STASIS ULCER OF RIGHT LOWER LEG WITH EDEMA OF RIGHT LOWER LEG: Status: ACTIVE | Noted: 2020-07-13

## 2020-07-13 PROBLEM — I83.891 VENOUS STASIS ULCER OF RIGHT LOWER LEG WITH EDEMA OF RIGHT LOWER LEG: Status: ACTIVE | Noted: 2020-07-13

## 2020-07-13 PROBLEM — R60.0 VENOUS STASIS ULCER OF LEFT LOWER LEG WITH EDEMA OF LEFT LOWER LEG: Status: ACTIVE | Noted: 2020-07-13

## 2020-07-13 PROBLEM — I83.029 VENOUS STASIS ULCER OF LEFT LOWER LEG WITH EDEMA OF LEFT LOWER LEG: Status: ACTIVE | Noted: 2020-07-13

## 2020-07-13 PROBLEM — R60.0 VENOUS STASIS ULCER OF RIGHT LOWER LEG WITH EDEMA OF RIGHT LOWER LEG: Status: ACTIVE | Noted: 2020-07-13

## 2020-11-20 DIAGNOSIS — I73.9 PAD (PERIPHERAL ARTERY DISEASE): Primary | ICD-10-CM

## 2020-11-20 DIAGNOSIS — I87.2 VENOUS INSUFFICIENCY: ICD-10-CM

## 2020-12-09 ENCOUNTER — HOSPITAL ENCOUNTER (OUTPATIENT)
Dept: VASCULAR SURGERY | Facility: CLINIC | Age: 70
Discharge: HOME | End: 2020-12-09
Attending: SURGERY
Payer: MEDICARE

## 2020-12-09 ENCOUNTER — OFFICE VISIT (OUTPATIENT)
Dept: VASCULAR SURGERY | Facility: CLINIC | Age: 70
End: 2020-12-09
Payer: MEDICARE

## 2020-12-09 VITALS
BODY MASS INDEX: 24.72 KG/M2 | DIASTOLIC BLOOD PRESSURE: 83 MMHG | HEART RATE: 79 BPM | SYSTOLIC BLOOD PRESSURE: 116 MMHG | HEIGHT: 68 IN | WEIGHT: 163.13 LBS | TEMPERATURE: 98 F

## 2020-12-09 DIAGNOSIS — I87.2 VENOUS INSUFFICIENCY: Primary | ICD-10-CM

## 2020-12-09 DIAGNOSIS — I73.9 PAD (PERIPHERAL ARTERY DISEASE): ICD-10-CM

## 2020-12-09 DIAGNOSIS — I87.2 VENOUS INSUFFICIENCY: ICD-10-CM

## 2020-12-09 PROCEDURE — 99213 OFFICE O/P EST LOW 20 MIN: CPT | Mod: PBBFAC,25 | Performed by: SURGERY

## 2020-12-09 PROCEDURE — 93970 PR US DUPLEX, UPPER OR LOWER EXT VENOUS,COMPLETE BILAT: ICD-10-PCS | Mod: 26,S$PBB,, | Performed by: SURGERY

## 2020-12-09 PROCEDURE — 93970 EXTREMITY STUDY: CPT | Mod: 26,S$PBB,, | Performed by: SURGERY

## 2020-12-09 PROCEDURE — 93923 UPR/LXTR ART STDY 3+ LVLS: CPT | Mod: 26,S$PBB,, | Performed by: SURGERY

## 2020-12-09 PROCEDURE — 93923 PR NON-INVASIVE PHYSIOLOGIC STUDY EXTREMITY 3 LEVELS: ICD-10-PCS | Mod: 26,S$PBB,, | Performed by: SURGERY

## 2020-12-09 PROCEDURE — 99214 OFFICE O/P EST MOD 30 MIN: CPT | Mod: S$PBB,,, | Performed by: SURGERY

## 2020-12-09 PROCEDURE — 99214 PR OFFICE/OUTPT VISIT, EST, LEVL IV, 30-39 MIN: ICD-10-PCS | Mod: S$PBB,,, | Performed by: SURGERY

## 2020-12-09 PROCEDURE — 93970 EXTREMITY STUDY: CPT | Mod: PBBFAC | Performed by: SURGERY

## 2020-12-09 PROCEDURE — 93923 UPR/LXTR ART STDY 3+ LVLS: CPT | Mod: PBBFAC | Performed by: SURGERY

## 2020-12-09 PROCEDURE — 99999 PR PBB SHADOW E&M-EST. PATIENT-LVL III: CPT | Mod: PBBFAC,,, | Performed by: SURGERY

## 2020-12-09 PROCEDURE — 99999 PR PBB SHADOW E&M-EST. PATIENT-LVL III: ICD-10-PCS | Mod: PBBFAC,,, | Performed by: SURGERY

## 2020-12-09 NOTE — PROGRESS NOTES
Jhon Garvey Sr  12/09/2020    HPI:  Patient is a 70 y.o. male with a h/o hypothyroidism, venous ulcers, DVT of R leg (?March/April 2018 - per patient at [facility]; in [place]) who is here today for f/u. Doing well since L GS evlt - L lower edema and pain is resolved  I initially met him for evaluation of a venous stasis ulcer in the L medial malleolus region; at this time he was placed on warfarin for 3 months for 'blood clot' in R leg.  States in June 2018 had a f/u u/s and no longer DVT.    Wound Care clinic in [place] (asked to see by Dr Ines Hurt)  The ulcer has been treated in wound clinic w/ wound vac, compression, and topical agents.     S/p  9/19/18: L GSV evlt 46cm  10/17/18: R GSV evlt 10cm ak- and 9 cm bk- access    No history of MI/stroke  Tobacco use: Former smoker, 3 pack years. Quit in his 20s    Retired from [employer] in 2002    Past Medical History:   Diagnosis Date    Complete rotator cuff tear of left shoulder     MRI 3/11/15    Thyroid disease      Past Surgical History:   Procedure Laterality Date    SHOULDER ARTHROSCOPY  4/3/15    (L) scope w/arch decompression     Family History   Problem Relation Age of Onset    Diabetes Mother      Social History     Socioeconomic History    Marital status:      Spouse name: Not on file    Number of children: Not on file    Years of education: Not on file    Highest education level: Not on file   Occupational History    Not on file   Social Needs    Financial resource strain: Not on file    Food insecurity     Worry: Not on file     Inability: Not on file    Transportation needs     Medical: Not on file     Non-medical: Not on file   Tobacco Use    Smoking status: Never Smoker    Smokeless tobacco: Never Used   Substance and Sexual Activity    Alcohol use: No     Alcohol/week: 0.0 standard drinks    Drug use: No    Sexual activity: Not on file   Lifestyle    Physical activity     Days per week: Not on file      Minutes per session: Not on file    Stress: Not on file   Relationships    Social connections     Talks on phone: Not on file     Gets together: Not on file     Attends Taoism service: Not on file     Active member of club or organization: Not on file     Attends meetings of clubs or organizations: Not on file     Relationship status: Not on file   Other Topics Concern    Not on file   Social History Narrative    Not on file       Current Outpatient Medications:     levothyroxine (SYNTHROID) 88 MCG tablet, TAKE 1 TABLET BY MOUTH ONCE DAILY, Disp: , Rfl:   -Ensure plus      REVIEW OF SYSTEMS:  General: negative; ENT: negative; Allergy and Immunology: negative; Hematological and Lymphatic: Negative; Endocrine: negative; Respiratory: no cough, shortness of breath, or wheezing; Cardiovascular: no chest pain or dyspnea on exertion; Gastrointestinal: no abdominal pain/back, change in bowel habits, or bloody stools; Genito-Urinary: no dysuria, trouble voiding, or hematuria; Musculoskeletal: negative  Neurological: no TIA or stroke symptoms; Psychiatric: no nervousness, anxiety or depression.    PHYSICAL EXAM:   There were no vitals filed for this visit.    General appearance:  Alert, well-appearing, and in no distress.  Oriented to person, place, and time   Neurological: Normal speech, no focal findings noted; CN II - XII grossly intact           Musculoskeletal: Digits/nail without cyanosis/clubbing.  Normal muscle strength/tone.                 Neck: Supple, no significant adenopathy; thyroid is not enlarged                  No carotid bruit can be auscultated                Chest:  Clear to auscultation, no wheezes, rales or rhonchi, symmetric air entry     No use of accessory muscles             Cardiac: Normal rate and regular rhythm, S1 and S2 normal; PMI non-displaced          Abdomen: Soft, nontender, nondistended, no masses or organomegaly     No rebound tenderness noted; bowel sounds normal     Pulsatile  aortic mass is palpable.     No groin adenopathy      Extremities:   2+ femoral pulses bilaterally     2+ DPs pedal pulses palpable.     Now no L pre-tibial edema     Healed L leg ulcer (previous: L medial malleolar ~ 3-4mm ulcerations)       Some R leg edema and discoloration; small R lateral ulceraton 1mm since 2017 - scab    LAB RESULTS:  Lab Results:  No results found for: K, CREATININE  No results found for: WBC, HCT, PLT  No results found for: HGBA1C    IMAGING:  U/S: L GSV is closed  R GSV is closed (prior u/s, 2019: R GSV is partially recanalized but no reflux)  No DVT    ABIs n/c but triphasic waveforms x2    Previous:  Aortic u/s: 2.6cm aorta    evlt u/s:  R GSV 5.8mm  L GSV 5.9mm  No LSV reflux    No DVT    PVRs: triphasic waveforms x2  ABIs: n/c    Previous -- none to review  DVT of R leg (?March/April 2018 - per patient at [facility]; in [place])  States in June 2018 had a f/u u/s and no longer DVT.    IMP/PLAN:  70 y.o. male with h/o hypothyroidism with Chronic venous insufficiency - CEAP C6 R leg -- -clinically has venous insufficiency; CEAP C5 L leg  No PAD except for non-compressible; no AAA  Hypercoagulable w/u was negative    Doing well from recent L GSV evlt and R GSV elvt  To Rx and prevent recurrence of ulceration of R leg, R GSV evlt was indicated - the ulcers had healed but recurred and now with Unna boot - healing. Will need this as needed; once healed use knee-compression daily  D/C clinic    John Morin MD DFSVS FACS   Vascular/Endovascular Surgery

## 2022-05-10 ENCOUNTER — OFFICE VISIT (OUTPATIENT)
Dept: NEUROSURGERY | Facility: CLINIC | Age: 72
End: 2022-05-10
Payer: MEDICARE

## 2022-05-10 VITALS
HEART RATE: 66 BPM | HEIGHT: 68 IN | SYSTOLIC BLOOD PRESSURE: 108 MMHG | WEIGHT: 163.13 LBS | BODY MASS INDEX: 24.72 KG/M2 | DIASTOLIC BLOOD PRESSURE: 70 MMHG | RESPIRATION RATE: 18 BRPM

## 2022-05-10 DIAGNOSIS — M47.812 CERVICAL SPONDYLOSIS: Primary | ICD-10-CM

## 2022-05-10 DIAGNOSIS — R20.0 NUMBNESS AND TINGLING IN BOTH HANDS: ICD-10-CM

## 2022-05-10 DIAGNOSIS — R20.2 NUMBNESS AND TINGLING IN BOTH HANDS: ICD-10-CM

## 2022-05-10 PROCEDURE — 99204 PR OFFICE/OUTPT VISIT, NEW, LEVL IV, 45-59 MIN: ICD-10-PCS | Mod: S$PBB,,, | Performed by: NURSE PRACTITIONER

## 2022-05-10 PROCEDURE — 99999 PR PBB SHADOW E&M-EST. PATIENT-LVL III: CPT | Mod: PBBFAC,,, | Performed by: NURSE PRACTITIONER

## 2022-05-10 PROCEDURE — 99213 OFFICE O/P EST LOW 20 MIN: CPT | Mod: PBBFAC,PN | Performed by: NURSE PRACTITIONER

## 2022-05-10 PROCEDURE — 99999 PR PBB SHADOW E&M-EST. PATIENT-LVL III: ICD-10-PCS | Mod: PBBFAC,,, | Performed by: NURSE PRACTITIONER

## 2022-05-10 PROCEDURE — 99204 OFFICE O/P NEW MOD 45 MIN: CPT | Mod: S$PBB,,, | Performed by: NURSE PRACTITIONER

## 2022-05-10 RX ORDER — METHOCARBAMOL 500 MG/1
500 TABLET, FILM COATED ORAL EVERY 8 HOURS PRN
Qty: 30 TABLET | Refills: 0 | Status: SHIPPED | OUTPATIENT
Start: 2022-05-10

## 2022-05-10 NOTE — PROGRESS NOTES
Neurosurgery History & Physical    Patient ID: Jhon Garvey Sr is a 71 y.o. male.    Chief Complaint   Patient presents with    Neck Pain     Neck pain when turns head left to right. Pain is restrictive; tingling in fingers on both hands.       History of Present Illness:   Mr. Garvey is a 71 year old male who presents today for evaluation of neck pain. He reports the pain has been present for several years but has progressed over the last several months. The pain occurs mostly with ROM and limits the movement in his neck. He also reports intermittent numbness and tingling in his hands. He is unsure if this is caused from bilateral shoulder issues or cervical spine issues. He denies radiating pain into the arms. Denies weakness, issues with hand dexterity or dropping objects, gait instability. He has not participated in PT or PM for his neck. He takes OTC medications as needed with some improvement in neck pain.     Review of Systems   Constitutional: Negative for activity change and fatigue.   Eyes: Negative for visual disturbance.   Respiratory: Negative for cough.    Cardiovascular: Negative for leg swelling.   Genitourinary: Negative for difficulty urinating.   Musculoskeletal: Positive for myalgias, neck pain and neck stiffness. Negative for arthralgias, back pain and gait problem.   Skin: Negative for wound.   Neurological: Positive for numbness. Negative for dizziness, weakness and headaches.   Psychiatric/Behavioral: Negative for confusion.       Past Medical History:   Diagnosis Date    Complete rotator cuff tear of left shoulder     MRI 3/11/15    Thyroid disease      Social History     Socioeconomic History    Marital status:    Tobacco Use    Smoking status: Never Smoker    Smokeless tobacco: Never Used   Substance and Sexual Activity    Alcohol use: No     Alcohol/week: 0.0 standard drinks    Drug use: No     Family History   Problem Relation Age of Onset    Diabetes Mother      Review  "of patient's allergies indicates:  No Known Allergies    Current Outpatient Medications:     levothyroxine (SYNTHROID) 88 MCG tablet, TAKE 1 TABLET BY MOUTH ONCE DAILY, Disp: , Rfl:     methocarbamoL (ROBAXIN) 500 MG Tab, Take 1 tablet (500 mg total) by mouth every 8 (eight) hours as needed (spasms)., Disp: 30 tablet, Rfl: 0    pyridoxine, vitamin B6, (VITAMIN B-6) 100 MG Tab, Take 50 mg by mouth once daily., Disp: , Rfl:     Current Facility-Administered Medications:     LIDOcaine HCL 2% jelly, , Topical (Top), 1 time in Clinic/HOD, Alexis Ramos MD  Blood pressure 108/70, pulse 66, resp. rate 18, height 5' 8" (1.727 m), weight 74 kg (163 lb 2.3 oz).      Neurologic Exam     Mental Status   Oriented to person, place, and time.   Level of consciousness: alert    Cranial Nerves     CN III, IV, VI   Extraocular motions are normal.     Motor Exam   Muscle bulk: normal  Overall muscle tone: normal    Strength   Strength 5/5 throughout.     Sensory Exam   Light touch normal.     Gait, Coordination, and Reflexes     Gait  Gait: normal    Reflexes   Right brachioradialis: 1+  Left brachioradialis: 1+  Right biceps: 1+  Left biceps: 1+  Right patellar: 1+  Left patellar: 1+  Right Rubio: absent  Left Rubio: absent  Right ankle clonus: absent  Left ankle clonus: absent      Physical Exam  Constitutional:       Appearance: He is well-developed.   HENT:      Head: Normocephalic and atraumatic.   Eyes:      Extraocular Movements: EOM normal.      Conjunctiva/sclera: Conjunctivae normal.   Abdominal:      Tenderness: There is no guarding.   Musculoskeletal:         General: Normal range of motion.      Cervical back: Normal range of motion.   Skin:     General: Skin is warm and dry.   Neurological:      Mental Status: He is alert and oriented to person, place, and time.      Gait: Gait is intact.      Deep Tendon Reflexes: Strength normal.      Reflex Scores:       Bicep reflexes are 1+ on the right side and 1+ on the " left side.       Brachioradialis reflexes are 1+ on the right side and 1+ on the left side.       Patellar reflexes are 1+ on the right side and 1+ on the left side.           Imaging:   No imaging for review.     Assessment & Plan:   1. Cervical spondylosis  MRI Cervical Spine Without Contrast    X-Ray Cervical Spine AP Lat with Flex Ex   2. Numbness and tingling in both hands  MRI Cervical Spine Without Contrast    X-Ray Cervical Spine AP Lat with Flex Ex       71 year old with chronic progressive neck pain limiting his ROM and bilateral hand numbness. I recommended he continue with OTC medications. Will add muscle relaxer and obtain imaging of the cervical spine with MRI and XRs. Will call patient with imaging results and discuss further treatment options at that time. He is agreeable to the plan.

## 2022-07-19 DIAGNOSIS — M47.812 CERVICAL SPONDYLOSIS: Primary | ICD-10-CM

## 2022-07-22 ENCOUNTER — CLINICAL SUPPORT (OUTPATIENT)
Dept: REHABILITATION | Facility: HOSPITAL | Age: 72
End: 2022-07-22
Payer: MEDICARE

## 2022-07-22 DIAGNOSIS — M47.812 CERVICAL SPONDYLOSIS: ICD-10-CM

## 2022-07-22 DIAGNOSIS — M54.2 NECK PAIN: ICD-10-CM

## 2022-07-22 DIAGNOSIS — G89.29 NECK PAIN, CHRONIC: ICD-10-CM

## 2022-07-22 DIAGNOSIS — M54.2 NECK PAIN, CHRONIC: ICD-10-CM

## 2022-07-22 DIAGNOSIS — M53.82 DECREASED ROM OF INTERVERTEBRAL DISCS OF CERVICAL SPINE: ICD-10-CM

## 2022-07-22 PROCEDURE — 97161 PT EVAL LOW COMPLEX 20 MIN: CPT | Mod: PO | Performed by: PHYSICAL THERAPIST

## 2022-07-22 NOTE — PLAN OF CARE
[facility]  Physical Therapy Initial Evaluation    Name: Jhon Garvey Sr  Clinic Number: [id number]    Therapy Diagnosis:   Encounter Diagnoses   Name Primary?    Cervical spondylosis     Neck pain     Decreased ROM of intervertebral discs of cervical spine     Neck pain, chronic      Physician: Tiffanie Turk NP    Physician Orders: PT Eval and Treat   Medical Diagnosis from Referral:   Cervical spondylosis     Evaluation Date: 2022  Authorization Period Expiration: 2022  Plan of Care Expiration: 2022  Visit # / Visits authorized: 1    Time In: 1100  Time Out: 1200  Total Billable Time: 60 minutes    Precautions: Standard    Subjective   Date of onset: 2022  History of current condition - Jhon reports: having neck pain, intermittent, localized with no radicular pain. Left sided neck pain more limited compared to right side. Patient reported difficulty with rotation compared to flexion. No falls noted.       Past Medical History:   Diagnosis Date    Complete rotator cuff tear of left shoulder     MRI 3/11/15    Thyroid disease      Jhon Garvey Sr  has a past surgical history that includes Shoulder arthroscopy (4/3/15).    Jhon has a current medication list which includes the following prescription(s): levothyroxine, methocarbamol, and pyridoxine (vitamin b6), and the following Facility-Administered Medications: lidocaine hcl 2%.    Review of patient's allergies indicates:  No Known Allergies     Imagin/10/2022  There degenerative changes at C6-7.  There is a mild anterolisthesis of C4 on C5 and C5 on C6 of approximately 2 mm.  This is stable with flexion and extension.  No fractures are seen.  The odontoid is intact.     1. Multilevel spondylosis, greatest at C5-6 where there is slight cord flattening at the right lateral aspect of the cord with preserved CSF surrounding the cord and no cord signal abnormality.  2. Multilevel foraminal stenosis, greatest  on the left at C3-4, right at C5-6 and bilaterally at C7-T1 where it is moderate-severe.  3. Bulky multilevel hypertrophic facet arthrosis.    Prior Therapy: none noted  Social History:  lives with their spouse  Occupation: Retired   Work demands: none noted  Leisure activities: yard work  Prior Level of Function: independent  Current Level of Function/limitation: modified independent, increase time noted with home management  Recent or major surgery: none noted   Accidents: none    Pain:  Current 3/10, worst 8/10, best 0/10   Location: bilateral upper to lower cervical region  Description: Aching, Dull, Tight and Variable  Constant symptoms: no  Intermittent symptoms: yes  Worse: seated position, rotation > flexion  Better: activity, stretch     Disturbed sleep: yes  Is it positional? yes  Unexplained weight loss: no    Pts goals: Decrease pain to neck. Improve neck motion for turning side to side.    Objective     Posture: cervical protraction, medial border winging    Sensation: Dermatomes:   Right Left Comment   C2/C3 (posterior head/neck) intact intact    C4 intact intact    C5 intact intact    C6 intact intact    C7 intact intact    C8 intact intact    T1 intact intact      DTR:   Right Left Comment   Biceps (C5-6) 2+ 2+    Triceps (C7) 2+ 2+                                                                                                                                                                                                                                    Cervical ROM: Inclinometer/Goniometer/%                            Pain/dysfunction/movement  Flexion  Protraction  Retraction 25%  0  50% Can't touch sternum to chin. Non-uniform curvature   Extension 75% Non-uniform curvature. Not within 10 degrees of parallel of the horizontal line   Lateral flexion (L)     Lateral flexion (R)     Right rotation 40 degrees Chin/nose not in line with mid-clavicle. +/- 80 degrees   Left rotation 21 degrees  Chin/nose not in line with mid-clavicle. +/- 80 degrees     OA -  AA -     Upper Extremity Strength  (R) UE  (L) UE    C4 Upper trap 5/5 C4 Upper trap 4+/5   C5 Deltoid(90 ABD): 5/5 C5 Deltoid (90 ABD): 4/5   C6 Biceps/ECRB/L 5/5 C6 Biceps/ECRB/L 5/5   C7 triceps/FCR 5/5 C7 triceps/FCR 5/5   C8 Abd.pollicis rudy 5/5 C8 Abd. Pollicis rudy. 5/5   T1 First Dorsal inter 5/5 T1 First Dorsal inter 5/5       Special Tests:  Distraction -   Compression -   Spurlings -     UMN:   Rubio: -    Upper Limb Neurodynamic testing:  ULTT: (Check Median). Good screen for radiculopathy -    Thoracic mobility: limited thoracic left rotation    Palpation: point tender to bilateral upper (sub-occipital area along with lower C/T spine TP)    Flexibility: pec-minor                                   CMS Impairment/Limitation/Restriction for FOTO Neck Survey  Status Limitation G-Code CMS Severity Modifier  Intake 39% 61% Current Status CL - At least 60 percent but less than 80 percent  Predicted 56% 44% Goal Status+ CK - At least 40 percent but less than 60 percent         TREATMENT   Treatment Time In: 1130  Treatment Time Out: 1135  Total Treatment time separate from Evaluation: 5 minutes    Jhon received therapeutic exercises to develop strength, endurance, ROM, flexibility, posture and core stabilization for 5 minutes including:  Seated: cervical retraction  Seated: T-spine extension, rotation  Scapular retraction    Home Exercises and Patient Education Provided    Education provided:   - Yes    Written Home Exercises Provided: yes.  Exercises were reviewed and Jhon was able to demonstrate them prior to the end of the session.  Jhon demonstrated good  understanding of the education provided.     See EMR under Patient Instructions for exercises provided 7/22/2022.    Assessment   Jhon is a 72 y.o. male referred to outpatient Physical Therapy with a medical diagnosis of cervical spondylosis. Pt presents with neck pain, decreased cervical  motion, decreased thoracic rotation.    Problem List: pain, decreased ROM, decreased flexibility, decreased strength, decreased balance and stability, decreased motor control and inability to participate fully in vocation pursuits.    Pt prognosis is Good.   Pt will benefit from skilled outpatient Physical Therapy to address the deficits stated above and in the chart below, provide pt/family education, and to maximize pt's level of independence.     Plan of care discussed with patient: Yes  Pt's spiritual, cultural and educational needs considered and patient is agreeable to the plan of care and goals as stated below:     Anticipated Barriers for therapy: none    Medical Necessity is demonstrated by the following  History  Co-morbidities and personal factors that may impact the plan of care Co-morbidities:   advanced age    Personal Factors:   no deficits     moderate   Examination  Body Structures and Functions, activity limitations and participation restrictions that may impact the plan of care Body Regions:   head  neck  upper extremities    Body Systems:    ROM  strength  transfers  transitions  motor control    Participation Restrictions:   Home management    Activity limitations:   Learning and applying knowledge  no deficits    General Tasks and Commands  no deficits    Communication  no deficits    Mobility  lifting and carrying objects  walking    Self care  no deficits    Domestic Life  doing house work (cleaning house, washing dishes, laundry)    Interactions/Relationships  no deficits    Life Areas  no deficits    Community and Social Life  no deficits         high   Clinical Presentation stable and uncomplicated low   Decision Making/ Complexity Score: low     Short Term GOALS:  In 4 weeks, pt. will:  - improve cervical retraction by 25% for neutral posture purposes.  - improve cervical rotation > 5 degrees for driving purposes.  - report > 25% reduction in neck episodes with home management.  -  decrease outcome measure limitation to <61%    Long Term GOALS:  In 8 weeks, pt. will:  - be independent and compliant with HEP and SX management   - decrease outcome measure limitation to <50%  - demonstrate cervical rotation > 45 degrees for driving purposes.  - report >/= 50 reduction in neck pain episodes for home management purposes.    Plan   Plan of care Certification: 7/22/2022 to 09/17/2022  Outpatient Physical Therapy 2 times weekly for 8 weeks to include the following interventions: Gait Training, Manual Therapy, Moist Heat/ Ice, Neuromuscular Re-ed, Patient Education, Therapeutic Activities and Therapeutic Exercise.      Jhon may at times be seen by a PTA as part of the Rehab Team.    Nikhil Combs, PT

## 2022-07-28 ENCOUNTER — CLINICAL SUPPORT (OUTPATIENT)
Dept: REHABILITATION | Facility: HOSPITAL | Age: 72
End: 2022-07-28
Payer: MEDICARE

## 2022-07-28 DIAGNOSIS — M54.2 NECK PAIN: Primary | ICD-10-CM

## 2022-07-28 DIAGNOSIS — M54.2 NECK PAIN, CHRONIC: ICD-10-CM

## 2022-07-28 DIAGNOSIS — G89.29 NECK PAIN, CHRONIC: ICD-10-CM

## 2022-07-28 DIAGNOSIS — M53.82 DECREASED ROM OF INTERVERTEBRAL DISCS OF CERVICAL SPINE: ICD-10-CM

## 2022-07-28 PROCEDURE — 97110 THERAPEUTIC EXERCISES: CPT | Mod: PO | Performed by: PHYSICAL THERAPIST

## 2022-07-28 PROCEDURE — 97140 MANUAL THERAPY 1/> REGIONS: CPT | Mod: PO | Performed by: PHYSICAL THERAPIST

## 2022-08-04 ENCOUNTER — CLINICAL SUPPORT (OUTPATIENT)
Dept: REHABILITATION | Facility: HOSPITAL | Age: 72
End: 2022-08-04
Payer: MEDICARE

## 2022-08-04 DIAGNOSIS — M54.2 NECK PAIN, CHRONIC: ICD-10-CM

## 2022-08-04 DIAGNOSIS — M54.2 NECK PAIN: Primary | ICD-10-CM

## 2022-08-04 DIAGNOSIS — M53.82 DECREASED ROM OF INTERVERTEBRAL DISCS OF CERVICAL SPINE: ICD-10-CM

## 2022-08-04 DIAGNOSIS — G89.29 NECK PAIN, CHRONIC: ICD-10-CM

## 2022-08-04 PROCEDURE — 97140 MANUAL THERAPY 1/> REGIONS: CPT | Mod: PO | Performed by: PHYSICAL THERAPIST

## 2022-08-04 PROCEDURE — 97110 THERAPEUTIC EXERCISES: CPT | Mod: PO | Performed by: PHYSICAL THERAPIST

## 2022-08-04 NOTE — PROGRESS NOTES
Physical Therapy Daily Treatment Note     Name: Jhon Garvey   Clinic Number: [id number]    Therapy Diagnosis:   Encounter Diagnoses   Name Primary?    Neck pain Yes    Decreased ROM of intervertebral discs of cervical spine     Neck pain, chronic      Physician: Tiffanie Turk NP    Visit Date: 8/4/2022  Physician Orders: PT Eval and Treat   Medical Diagnosis from Referral:   Cervical spondylosis      Evaluation Date: 7/22/2022  Authorization Period Expiration: 07/19/2022  Plan of Care Expiration: 09/17/2022  Visit # / Visits authorized: 3     Time In: 0900  Time Out: 0950  Total Billable Time: 40 minutes     Precautions: Standard    Subjective     Pt reports: doing well with some improvement in his rotation. No new s/s.  He was compliant with home exercise program.  Response to previous treatment: muscle soreness  Functional change: improvement in rotation    Pain: 2/10  Location: cervical   Precautions: standard    Objective     Jhon received therapeutic exercises to develop strength, endurance, ROM, flexibility, posture and core stabilization for 30 minutes including:  Posture: cervical protraction, medial border winging                                                                                                                                                                                                                                    Cervical ROM: Inclinometer/Goniometer/%                            Pain/dysfunction/movement  Flexion  Protraction  Retraction 25%  0  50% Can't touch sternum to chin. Non-uniform curvature   Extension 75% Non-uniform curvature. Not within 10 degrees of parallel of the horizontal line   Lateral flexion (L)       Lateral flexion (R)       Right rotation 40 degrees Chin/nose not in line with mid-clavicle. +/- 80 degrees   Left rotation 21 degrees Chin/nose not in line with mid-clavicle. +/- 80 degrees      OA -  AA -     Upper Extremity Strength  (R) UE   (L) UE     C4  "Upper trap 5/5 C4 Upper trap 4+/5   C5 Deltoid(90 ABD): 5/5 C5 Deltoid (90 ABD): 4/5   C6 Biceps/ECRB/L 5/5 C6 Biceps/ECRB/L 5/5   C7 triceps/FCR 5/5 C7 triceps/FCR 5/5   C8 Abd.pollicis rudy 5/5 C8 Abd. Pollicis rudy. 5/5   T1 First Dorsal inter 5/5 T1 First Dorsal inter 5/5      Special Tests:  Distraction -   Compression -   Spurlings -      UMN:   Rubio: -     Upper Limb Neurodynamic testing:  ULTT: (Check Median). Good screen for radiculopathy -     Thoracic mobility: limited thoracic left rotation    UBE: 3 min fwd/bwd each  Seated: cervical retraction 1/30" each, f/b 1/10  Seated: partial retraction, rotation 1/20 each  Seated: T-spine extension 1/10, progressed to clinician assistance x 10  Seated :T-spine rotation  S/L: T-spine rotation with clinician assistance on pelvis x 10 each  Seated: rows 40# 2/10    Manual: (10 minutes): addressing cervical /thoracic mobility, myofascial for joint mobility purposes.  -sub-occipital inhibitive distraction  - traction with belt  - STM    Home Exercises Provided and Patient Education Provided     Education provided:   - Yes    Written Home Exercises Provided: Patient instructed to cont prior HEP.  Exercises were reviewed and Jhon was able to demonstrate them prior to the end of the session.  Jhno demonstrated good  understanding of the education provided.     See EMR under Patient Instructions for exercises provided prior visit.    Assessment   Good tolerance with TE progression continues. No adverse effects.    Jhon Is progressing well towards his goals.   Pt prognosis is Good.     Pt will continue to benefit from skilled outpatient physical therapy to address the deficits listed in the problem list box on initial evaluation, provide pt/family education and to maximize pt's level of independence in the home and community environment.     Pt's spiritual, cultural and educational needs considered and pt agreeable to plan of care and goals.    Anticipated barriers to " physical therapy:     Goals:   Short Term GOALS:  In 4 weeks, pt. will:  - improve cervical retraction by 25% for neutral posture purposes.  - improve cervical rotation > 5 degrees for driving purposes.  - report > 25% reduction in neck episodes with home management.  - decrease outcome measure limitation to <61%     Long Term GOALS:  In 8 weeks, pt. will:  - be independent and compliant with HEP and SX management   - decrease outcome measure limitation to <50%  - demonstrate cervical rotation > 45 degrees for driving purposes.  - report >/= 50 reduction in neck pain episodes for home management purposes.    Plan     Continue with POC    Nikhil Combs, PT

## 2022-08-11 ENCOUNTER — CLINICAL SUPPORT (OUTPATIENT)
Dept: REHABILITATION | Facility: HOSPITAL | Age: 72
End: 2022-08-11
Payer: MEDICARE

## 2022-08-11 DIAGNOSIS — M53.82 DECREASED ROM OF INTERVERTEBRAL DISCS OF CERVICAL SPINE: ICD-10-CM

## 2022-08-11 DIAGNOSIS — G89.29 NECK PAIN, CHRONIC: ICD-10-CM

## 2022-08-11 DIAGNOSIS — M54.2 NECK PAIN, CHRONIC: ICD-10-CM

## 2022-08-11 DIAGNOSIS — M54.2 NECK PAIN: Primary | ICD-10-CM

## 2022-08-11 PROCEDURE — 97140 MANUAL THERAPY 1/> REGIONS: CPT | Mod: PO,CQ

## 2022-08-11 PROCEDURE — 97110 THERAPEUTIC EXERCISES: CPT | Mod: PO,CQ

## 2022-08-11 NOTE — PROGRESS NOTES
"  Physical Therapy Daily Treatment Note     Name: Jhon Garvey   Clinic Number: [id number]    Therapy Diagnosis:   Encounter Diagnoses   Name Primary?    Neck pain Yes    Decreased ROM of intervertebral discs of cervical spine     Neck pain, chronic      Physician: Tiffanie Turk NP    Visit Date: 8/11/2022  Physician Orders: PT Eval and Treat   Medical Diagnosis from Referral:   Cervical spondylosis      Evaluation Date: 7/22/2022  Authorization Period Expiration: 07/19/2022  Plan of Care Expiration: 09/17/2022  Visit # / Visits authorized: 3     Time In: 0801 AM  Time Out: 0850 AM  Total Billable Time: 49 minutes     Precautions: Standard    Subjective     Pt reports: his neck is stiff but this is typical during the morning hours. Patient states he has noticed improvements since starting PT. He was compliant with home exercise program.  Response to previous treatment: muscle soreness  Functional change: improvement in rotation    Pain: 4/10  Location: cervical   Precautions: standard    Objective     Jhon received therapeutic exercises to develop strength, endurance, ROM, flexibility, posture and core stabilization for 30 minutes including:  Posture: cervical protraction, medial border winging     UBE: 3 min fwd/bwd each  Seated: cervical retraction 1/30" each, f/b 1/10  Seated: partial retraction, rotation 1/20 each  Seated: T-spine extension 1/10, progressed to clinician assistance x 10  Seated :T-spine rotation  S/L: T-spine rotation with clinician assistance on pelvis x 10 each  Seated: rows 60# 2/10  Overhead ball roll on wall for thoracic extension x 10, 5 sec hold     Manual: (10 minutes): addressing cervical /thoracic mobility, myofascial for joint mobility purposes.  -sub-occipital inhibitive distraction  - traction with belt (NP)  - STM    Home Exercises Provided and Patient Education Provided     Education provided:   - Yes    Written Home Exercises Provided: Patient instructed to cont prior " HEP.  Exercises were reviewed and Jhon was able to demonstrate them prior to the end of the session.  Jhon demonstrated good  understanding of the education provided.     See EMR under Patient Instructions for exercises provided prior visit.    Assessment     Jhon's cervical ROM remains limited but this does improve with manual therapy and passive repetition. Good tolerance to progression of thoracic extension focused exercises. Patient diligent with exercise performance and he is motivated to improve condition.     Jhon Is progressing well towards his goals.   Pt prognosis is Good.     Pt will continue to benefit from skilled outpatient physical therapy to address the deficits listed in the problem list box on initial evaluation, provide pt/family education and to maximize pt's level of independence in the home and community environment.     Pt's spiritual, cultural and educational needs considered and pt agreeable to plan of care and goals.    Anticipated barriers to physical therapy:     Goals:   Short Term GOALS:  In 4 weeks, pt. will:  - improve cervical retraction by 25% for neutral posture purposes.  - improve cervical rotation > 5 degrees for driving purposes.  - report > 25% reduction in neck episodes with home management.  - decrease outcome measure limitation to <61%     Long Term GOALS:  In 8 weeks, pt. will:  - be independent and compliant with HEP and SX management   - decrease outcome measure limitation to <50%  - demonstrate cervical rotation > 45 degrees for driving purposes.  - report >/= 50 reduction in neck pain episodes for home management purposes.    Plan     Continue with HEIDY Posadas, AMINA

## 2022-08-17 ENCOUNTER — CLINICAL SUPPORT (OUTPATIENT)
Dept: REHABILITATION | Facility: HOSPITAL | Age: 72
End: 2022-08-17
Payer: MEDICARE

## 2022-08-17 DIAGNOSIS — G89.29 NECK PAIN, CHRONIC: ICD-10-CM

## 2022-08-17 DIAGNOSIS — M54.2 NECK PAIN, CHRONIC: ICD-10-CM

## 2022-08-17 DIAGNOSIS — M54.2 NECK PAIN: Primary | ICD-10-CM

## 2022-08-17 DIAGNOSIS — M53.82 DECREASED ROM OF INTERVERTEBRAL DISCS OF CERVICAL SPINE: ICD-10-CM

## 2022-08-17 PROCEDURE — 97110 THERAPEUTIC EXERCISES: CPT | Mod: PO,CQ

## 2022-08-17 PROCEDURE — 97140 MANUAL THERAPY 1/> REGIONS: CPT | Mod: PO,CQ

## 2022-08-17 NOTE — PROGRESS NOTES
"  Physical Therapy Daily Treatment Note     Name: Jhon Garvey   Clinic Number: [id number]    Therapy Diagnosis:   Encounter Diagnoses   Name Primary?    Neck pain Yes    Decreased ROM of intervertebral discs of cervical spine     Neck pain, chronic      Physician: Tiffanie Turk NP    Visit Date: 8/17/2022  Physician Orders: PT Eval and Treat   Medical Diagnosis from Referral: Cervical spondylosis    Evaluation Date: 7/22/2022  Authorization Period Expiration: 07/19/2022  Plan of Care Expiration: 09/17/2022  Visit # / Visits authorized: 4/20     Time In: 1030 AM  Time Out: 1115 AM  Total Billable Time: 45 minutes     Precautions: Standard    Subjective     Pt reports: he is able to turn his head more but this still increases pain. Patient states he does have more discomfort with (L) rotation. He was compliant with home exercise program.  Response to previous treatment: muscle soreness  Functional change: improvement in rotation    Pain: 4/10  Location: cervical     Precautions: standard    Objective     Jhon received therapeutic exercises to develop strength, endurance, ROM, flexibility, posture and core stabilization for 30 minutes including:    Posture: cervical protraction, medial border winging     UBE: 3 min fwd/bwd each  Seated: cervical retraction 1/30" each, f/b 1/10    Seated: T-spine extension 1/10, progressed to clinician assistance x 10  Seated :T-spine rotation  S/L: T-spine rotation with clinician assistance on pelvis x 10 each  Seated: rows 60# 2/10  Overhead ball roll on wall for thoracic extension x 10, 5 sec hold   Standing with ball behind head at wall: partial retraction, rotation 1/20 each    Manual: (10 minutes): addressing cervical /thoracic mobility, myofascial for joint mobility purposes.  -sub-occipital inhibitive distraction  - traction with belt (NP)  - STM    Home Exercises Provided and Patient Education Provided     Education provided:   - HEP compliance     Written Home Exercises " Provided: Patient instructed to cont prior HEP.  Exercises were reviewed and Jhon was able to demonstrate them prior to the end of the session.  Jhon demonstrated good  understanding of the education provided.     See EMR under Patient Instructions for exercises provided prior visit.    Assessment     Jhon demonstrates good response to therapeutic exercise as cervical rotation improves as session progresses. Rotational ROM remains limited but he is able to progress exercise without increasing pain. Patient diligent with exercise performance and he is motivated to improve condition.     Jhon Is progressing well towards his goals.   Pt prognosis is Good.     Pt will continue to benefit from skilled outpatient physical therapy to address the deficits listed in the problem list box on initial evaluation, provide pt/family education and to maximize pt's level of independence in the home and community environment.     Pt's spiritual, cultural and educational needs considered and pt agreeable to plan of care and goals.    Anticipated barriers to physical therapy:     Goals:   Short Term GOALS:  In 4 weeks, pt. will:  - improve cervical retraction by 25% for neutral posture purposes.  - improve cervical rotation > 5 degrees for driving purposes.  - report > 25% reduction in neck episodes with home management.  - decrease outcome measure limitation to <61%     Long Term GOALS:  In 8 weeks, pt. will:  - be independent and compliant with HEP and SX management   - decrease outcome measure limitation to <50%  - demonstrate cervical rotation > 45 degrees for driving purposes.  - report >/= 50 reduction in neck pain episodes for home management purposes.    Plan     Continue with HEIDY Posadas, PTA

## 2022-08-25 ENCOUNTER — CLINICAL SUPPORT (OUTPATIENT)
Dept: REHABILITATION | Facility: HOSPITAL | Age: 72
End: 2022-08-25
Payer: MEDICARE

## 2022-08-25 DIAGNOSIS — M54.2 NECK PAIN: Primary | ICD-10-CM

## 2022-08-25 DIAGNOSIS — M53.82 DECREASED ROM OF INTERVERTEBRAL DISCS OF CERVICAL SPINE: ICD-10-CM

## 2022-08-25 DIAGNOSIS — M54.2 NECK PAIN, CHRONIC: ICD-10-CM

## 2022-08-25 DIAGNOSIS — G89.29 NECK PAIN, CHRONIC: ICD-10-CM

## 2022-08-25 PROCEDURE — 97110 THERAPEUTIC EXERCISES: CPT | Mod: PO,CQ

## 2022-08-25 PROCEDURE — 97140 MANUAL THERAPY 1/> REGIONS: CPT | Mod: PO,CQ

## 2022-08-25 NOTE — PROGRESS NOTES
"  Physical Therapy Daily Treatment Note     Name: Jhon Garvey   Clinic Number: [id number]    Therapy Diagnosis:   Encounter Diagnoses   Name Primary?    Neck pain Yes    Decreased ROM of intervertebral discs of cervical spine     Neck pain, chronic      Physician: Tiffanie Turk NP    Visit Date: 8/25/2022  Physician Orders: PT Eval and Treat   Medical Diagnosis from Referral: Cervical spondylosis    Evaluation Date: 7/22/2022  Authorization Period Expiration: 07/19/2022  Plan of Care Expiration: 09/17/2022  Visit # / Visits authorized: 5/20     Time In: 0858 AM  Time Out: 0947 AM  Total Billable Time: 30 minutes     Precautions: Standard    Subjective     Pt reports: he is pain free at rest but he does have discomfort with active cervical rotation. He was compliant with home exercise program.  Response to previous treatment: muscle soreness  Functional change: improvement in rotation    Pain: 0/10  Location: cervical     Precautions: standard    Objective     Jhon received therapeutic exercises to develop strength, endurance, ROM, flexibility, posture and core stabilization for 30 minutes including:    Posture: cervical protraction, medial border winging     UBE: 3 min fwd/bwd each  Seated: cervical retraction 1/30" each, f/b 1/10    Seated: T-spine extension 1/10, progressed to clinician assistance x 10  Seated :T-spine rotation  S/L: T-spine rotation with clinician assistance on pelvis x 10 each  Seated: rows 60# 2/10  Overhead ball roll on wall for thoracic extension x 10, 5 sec hold   Standing with ball behind head at wall: partial retraction, rotation 1/20 each    Manual: (10 minutes): addressing cervical /thoracic mobility, myofascial for joint mobility purposes.  -sub-occipital inhibitive distraction  - traction with belt (NP)  - STM    Home Exercises Provided and Patient Education Provided     Education provided:   - HEP compliance     Written Home Exercises Provided: Patient instructed to cont prior " HEP.  Exercises were reviewed and Jhon was able to demonstrate them prior to the end of the session.  Jhon demonstrated good  understanding of the education provided.     See EMR under Patient Instructions for exercises provided prior visit.    Assessment     Jhon's cervical rotation ROM remains limited but he is able to progress exercise without increasing pain. Improvements in ROM achieved with exercise and manual therapy. Patient diligent with exercise performance and he is motivated to improve condition.     Jhon Is progressing well towards his goals.   Pt prognosis is Good.     Pt will continue to benefit from skilled outpatient physical therapy to address the deficits listed in the problem list box on initial evaluation, provide pt/family education and to maximize pt's level of independence in the home and community environment.     Pt's spiritual, cultural and educational needs considered and pt agreeable to plan of care and goals.    Anticipated barriers to physical therapy:     Goals:   Short Term GOALS:  In 4 weeks, pt. will:  - improve cervical retraction by 25% for neutral posture purposes.  - improve cervical rotation > 5 degrees for driving purposes.  - report > 25% reduction in neck episodes with home management.  - decrease outcome measure limitation to <61%     Long Term GOALS:  In 8 weeks, pt. will:  - be independent and compliant with HEP and SX management   - decrease outcome measure limitation to <50%  - demonstrate cervical rotation > 45 degrees for driving purposes.  - report >/= 50 reduction in neck pain episodes for home management purposes.    Plan     Continue with POC    Ginny Posadas, PTA

## 2022-09-01 ENCOUNTER — CLINICAL SUPPORT (OUTPATIENT)
Dept: REHABILITATION | Facility: HOSPITAL | Age: 72
End: 2022-09-01
Payer: MEDICARE

## 2022-09-01 DIAGNOSIS — M54.2 NECK PAIN, CHRONIC: ICD-10-CM

## 2022-09-01 DIAGNOSIS — G89.29 NECK PAIN, CHRONIC: ICD-10-CM

## 2022-09-01 DIAGNOSIS — M53.82 DECREASED ROM OF INTERVERTEBRAL DISCS OF CERVICAL SPINE: ICD-10-CM

## 2022-09-01 DIAGNOSIS — M54.2 NECK PAIN: Primary | ICD-10-CM

## 2022-09-01 PROCEDURE — 97110 THERAPEUTIC EXERCISES: CPT | Mod: PO | Performed by: PHYSICAL THERAPIST

## 2022-09-01 PROCEDURE — 97140 MANUAL THERAPY 1/> REGIONS: CPT | Mod: PO | Performed by: PHYSICAL THERAPIST

## 2022-09-01 NOTE — PROGRESS NOTES
"  Physical Therapy Daily Treatment Note     Name: Jhon Garvey   Clinic Number: [id number]    Therapy Diagnosis:   Encounter Diagnoses   Name Primary?    Neck pain Yes    Decreased ROM of intervertebral discs of cervical spine     Neck pain, chronic      Physician: Tiffanie Turk NP    Visit Date: 9/1/2022  Physician Orders: PT Eval and Treat   Medical Diagnosis from Referral: Cervical spondylosis    Evaluation Date: 7/22/2022  Authorization Period Expiration: 07/19/2022  Plan of Care Expiration: 09/17/2022  Visit # / Visits authorized: 6/20     Time In: 0900  Time Out: 0940  Total Billable Time: 40 minutes     Precautions: Standard    Subjective     Pt reports: feeling better from previous session. He was compliant with home exercise program.  Response to previous treatment: muscle soreness  Functional change: improvement in cervical rotation    Pain: 0/10  Location: cervical     Precautions: standard    Objective     Jhon received therapeutic exercises to develop strength, endurance, ROM, flexibility, posture and core stabilization for 30 minutes including:    Posture: cervical protraction, medial border winging     UBE: 3 min fwd/bwd each  Seated: cervical retraction 1/30" each, f/b 1/10    Seated: T-spine extension 1/10, progressed to clinician assistance x 10  Seated :T-spine rotation  S/L: T-spine rotation with clinician assistance on pelvis x 10 each    Seated: rows 60# 2/10  Overhead ball roll on wall for thoracic extension x 10, 5 sec hold   Standing with ball behind head at wall: partial retraction, rotation 1/20 each    Manual: (10 minutes): addressing cervical /thoracic mobility, myofascial for joint mobility purposes.  -sub-occipital inhibitive distraction  - traction with belt (NP)  - STM  - cervical rotation with clinician over-pressure    Right rotation 40 degrees Chin/nose not in line with mid-clavicle. +/- 80 degrees   Left rotation 43/50 degrees Chin/nose not in line with mid-clavicle. +/- 80 " degrees      OA -  AA -       Home Exercises Provided and Patient Education Provided     Education provided:   - HEP compliance     Written Home Exercises Provided: Patient instructed to cont prior HEP.  Exercises were reviewed and Jhon was able to demonstrate them prior to the end of the session.  Jhon demonstrated good  understanding of the education provided.     See EMR under Patient Instructions for exercises provided prior visit.    Assessment   Improve cervical rotation (left) noted. No adverse effects.    Jhon Is progressing well towards his goals.   Pt prognosis is Good.     Pt will continue to benefit from skilled outpatient physical therapy to address the deficits listed in the problem list box on initial evaluation, provide pt/family education and to maximize pt's level of independence in the home and community environment.     Pt's spiritual, cultural and educational needs considered and pt agreeable to plan of care and goals.    Anticipated barriers to physical therapy:     Goals:   Short Term GOALS:  In 4 weeks, pt. will:  - improve cervical retraction by 25% for neutral posture purposes.  - improve cervical rotation > 5 degrees for driving purposes.  - report > 25% reduction in neck episodes with home management.  - decrease outcome measure limitation to <61%     Long Term GOALS:  In 8 weeks, pt. will:  - be independent and compliant with HEP and SX management   - decrease outcome measure limitation to <50%  - demonstrate cervical rotation > 45 degrees for driving purposes.  - report >/= 50 reduction in neck pain episodes for home management purposes.    Plan     Continue with HEIDY Combs, PT

## 2022-10-27 ENCOUNTER — OFFICE VISIT (OUTPATIENT)
Dept: PODIATRY | Facility: CLINIC | Age: 72
End: 2022-10-27
Payer: MEDICARE

## 2022-10-27 VITALS
DIASTOLIC BLOOD PRESSURE: 79 MMHG | HEIGHT: 68 IN | SYSTOLIC BLOOD PRESSURE: 147 MMHG | WEIGHT: 179 LBS | BODY MASS INDEX: 27.13 KG/M2 | HEART RATE: 60 BPM

## 2022-10-27 DIAGNOSIS — M72.2 PLANTAR FASCIITIS: ICD-10-CM

## 2022-10-27 DIAGNOSIS — I73.9 PAD (PERIPHERAL ARTERY DISEASE): Primary | ICD-10-CM

## 2022-10-27 DIAGNOSIS — L60.0 INGROWN TOENAIL WITHOUT INFECTION: ICD-10-CM

## 2022-10-27 PROCEDURE — 99204 PR OFFICE/OUTPT VISIT, NEW, LEVL IV, 45-59 MIN: ICD-10-PCS | Mod: 25,S$PBB,, | Performed by: PODIATRIST

## 2022-10-27 PROCEDURE — 99213 OFFICE O/P EST LOW 20 MIN: CPT | Mod: PBBFAC,25 | Performed by: PODIATRIST

## 2022-10-27 PROCEDURE — 99999 PR PBB SHADOW E&M-EST. PATIENT-LVL III: ICD-10-PCS | Mod: PBBFAC,,, | Performed by: PODIATRIST

## 2022-10-27 PROCEDURE — 99204 OFFICE O/P NEW MOD 45 MIN: CPT | Mod: 25,S$PBB,, | Performed by: PODIATRIST

## 2022-10-27 PROCEDURE — 11750 EXCISION NAIL&NAIL MATRIX: CPT | Mod: T4,S$PBB,, | Performed by: PODIATRIST

## 2022-10-27 PROCEDURE — 11750 PR REMOVAL OF NAIL BED: ICD-10-PCS | Mod: T4,S$PBB,, | Performed by: PODIATRIST

## 2022-10-27 PROCEDURE — 99999 PR PBB SHADOW E&M-EST. PATIENT-LVL III: CPT | Mod: PBBFAC,,, | Performed by: PODIATRIST

## 2022-10-27 PROCEDURE — 11750 EXCISION NAIL&NAIL MATRIX: CPT | Mod: T4,PBBFAC | Performed by: PODIATRIST

## 2022-11-10 ENCOUNTER — OFFICE VISIT (OUTPATIENT)
Dept: PODIATRY | Facility: CLINIC | Age: 72
End: 2022-11-10
Payer: MEDICARE

## 2022-11-10 VITALS — WEIGHT: 179 LBS | BODY MASS INDEX: 27.13 KG/M2 | HEIGHT: 68 IN

## 2022-11-10 DIAGNOSIS — M21.6X2 ACQUIRED EQUINUS DEFORMITY OF BOTH FEET: ICD-10-CM

## 2022-11-10 DIAGNOSIS — L60.0 INGROWN TOENAIL WITHOUT INFECTION: Primary | ICD-10-CM

## 2022-11-10 DIAGNOSIS — M21.6X1 ACQUIRED EQUINUS DEFORMITY OF BOTH FEET: ICD-10-CM

## 2022-11-10 PROCEDURE — 99213 PR OFFICE/OUTPT VISIT, EST, LEVL III, 20-29 MIN: ICD-10-PCS | Mod: S$PBB,,, | Performed by: PODIATRIST

## 2022-11-10 PROCEDURE — 99213 OFFICE O/P EST LOW 20 MIN: CPT | Mod: PBBFAC | Performed by: PODIATRIST

## 2022-11-10 PROCEDURE — 99999 PR PBB SHADOW E&M-EST. PATIENT-LVL III: CPT | Mod: PBBFAC,,, | Performed by: PODIATRIST

## 2022-11-10 PROCEDURE — 99999 PR PBB SHADOW E&M-EST. PATIENT-LVL III: ICD-10-PCS | Mod: PBBFAC,,, | Performed by: PODIATRIST

## 2022-11-10 PROCEDURE — 99213 OFFICE O/P EST LOW 20 MIN: CPT | Mod: S$PBB,,, | Performed by: PODIATRIST

## 2022-11-10 NOTE — PROGRESS NOTES
"Subjective:     Patient ID: Jhon Garvey Sr is a 72 y.o. male.    Chief Complaint: Ingrown Toenail (Ingrown to left 5th digit, rates pain 9/10 while wearing closed toe shoes, Wears casual shoes with socks, non diabetic Pt, last seen on 07/01/2022 with PCP Dr. Fishman)    Jhon is a 72 y.o. male who presents to the clinic complaining of painful ingrown toenail on the left 5th toe. Patient rates pain 9/10. Patient also complains of bilateral heel pain. Patient states pain started in heels after in the garden. Patient has no other pedal complaints at this time.     Patient Active Problem List   Diagnosis    PAD (peripheral artery disease)    Venous insufficiency of right leg    Venous insufficiency    Venous stasis ulcer of left lower leg with edema of left lower leg    Venous stasis ulcer of right lower leg with edema of right lower leg    Bilateral leg edema    Neck pain    Decreased ROM of intervertebral discs of cervical spine    Neck pain, chronic       Medication List with Changes/Refills   Current Medications    LEVOTHYROXINE (SYNTHROID) 88 MCG TABLET    TAKE 1 TABLET BY MOUTH ONCE DAILY    METHOCARBAMOL (ROBAXIN) 500 MG TAB    Take 1 tablet (500 mg total) by mouth every 8 (eight) hours as needed (spasms).    PYRIDOXINE, VITAMIN B6, (B-6) 100 MG TAB    Take 50 mg by mouth once daily.       Review of patient's allergies indicates:  No Known Allergies    Past Surgical History:   Procedure Laterality Date    SHOULDER ARTHROSCOPY  4/3/15    (L) scope w/arch decompression       Family History   Problem Relation Age of Onset    Diabetes Mother        Social History     Socioeconomic History    Marital status:    Tobacco Use    Smoking status: Never    Smokeless tobacco: Never   Substance and Sexual Activity    Alcohol use: No     Alcohol/week: 0.0 standard drinks    Drug use: No       Vitals:    10/27/22 0828   BP: (!) 147/79   Pulse: 60   Weight: 81.2 kg (179 lb 0.2 oz)   Height: 5' 8" (1.727 m)   PainSc:   9 "   PainLoc: Toe       Review of Systems   Constitutional:  Negative for chills and fever.   Respiratory:  Negative for shortness of breath.    Cardiovascular:  Negative for chest pain, palpitations, orthopnea, claudication and leg swelling.   Gastrointestinal:  Negative for diarrhea, nausea and vomiting.   Musculoskeletal:  Negative for joint pain.   Skin:  Negative for rash.   Neurological:  Negative for dizziness, tingling, sensory change, focal weakness and weakness.   Psychiatric/Behavioral: Negative.             Objective:       PHYSICAL EXAM: Apperance: Alert and orient in no distress,well developed, and with good attention to grooming and body habits  Lower Extremity Exam   VASCULAR: Dorsalis pedis pulses 2/4 bilateral and Posterior Tibial pulses 2/4 bilateral Capillary fill time <blanched bilateral. No edema observed bilateral . Varicosities absent bilateral  Skin temperature of the lower extremities is warm to warm, proximal to distal. Hair growth dim bilateral   DERMATOLOGICAL: No skin rash, subcutaneous nodules, lesions or ulcers observed. Left 5th toe nail observed to be mildly incurvated at medial borders and slight obstructed in the nail grooves with soft tissue. T No purulent drainage, no odor, and no increased temperature observed to left 5th toe.    NEUROLOGICAL: Light touch, sharp-dull, proprioception all present and equal bilaterally.   MUSCULOSKELETAL: Muscle strength 5/5 for all foot inverters, everters, plantarflexors, and  dorsiflexors bilateral. Pain on palpation of left 5th medial toe nail border. Pain on palpation of dorsal nail plate left 5th toe. Decreased ankle joint ROM bilateral. Positive pain to palpation bilateral plantar medial tubercle. Negative pain on medial-lateral compression of the calcaneus.        Assessment:       ICD-10-CM ICD-9-CM   1. PAD (peripheral artery disease)  I73.9 443.9   2. Ingrown toenail without infection - Left Foot  L60.0 703.0   3. Plantar fasciitis  M72.2  728.71       Plan:   PAD (peripheral artery disease)    Ingrown toenail without infection - Left Foot    Plantar fasciitis    I counseled the patient on his conditions, regarding findings of my examination, my impressions, and usual treatment plan.   Patient consented verbal and written to permanent total nail avulsion of left 5th toe.   Procedure performed: A local digital sebastian was administered to the left 5th of  6cc of 1% Lidocaine plain. Attention was then directed to the left 5th toe to check for adequate anesthesia. A penrose drain tourniquet was applied to the base of the left 5th toe for hemostasis.  Attention was then directed the medial and lateral nail border to separate using a spatula the most proximal nail border at the eponychium was released to the area of the matrix. Then the border was released at the medial and lateral aspect and at the plantar aspect distally to proximally. Using a straight hemostat, the free nail plate was clamped and removed. Using a tissue nipper, residual tissue was then removed. The nail bed area was inspected and probed proximally with a curette for any spicules. Next a 60 second application of phenol was applied to the entire nail bed. The area was flushed with copious amounts of sterile normal saline. Betadine was applied to the area and dry sterile dressing of gauze and Coflex. The penrose drain tourniquet was released. Neurovascular status was assessed and noted to be intact. Patient tolerated procedure well.   The patient was given oral and written instructions for post-op care including daily soaks of water and Epson salt followed by application of antibiotic ointment  and light dressing.  The patient was instructed to take Tylenol over-the-counter q4h prn pain and to call clinic immediately if there is increased pain, increased redness, pus, fever, chills, nausea, or vomiting present.   Dispensed heel lifts to be worn in shoes.   The patient and I reviewed the types  of shoes he should be wearing, my recommendation includes generally the best time of the day for a shoe fitting is the afternoon, shoes with a wide toe box, very good cushion, and tennis shoes with removable inner soles.The patient and I reviewed my recommendations for over-the-counter orthotic inserts.  Patient to return 2 weeks or sooner if needed.          Bryon Lin DPM  [facility]

## 2022-11-10 NOTE — PROGRESS NOTES
"Subjective:     Patient ID: Jhon Garvey Sr is a 72 y.o. male.    Chief Complaint: Follow-up (Nail avulsion left 5 th digit, states he pours alcohol on it x 2 days, no c/o pain, wears , non-diabetic Pt, last seen on 07/01/22 with PCP Dr. Williasmon)    HPI: This 72 year old female returns to the clinic 2 weeks post nail procedure of left 5th toe.  Patient has no complaints of fever chills or sweats.  Patient denies pain.  Patient has been dressing as instructed.     Patient Active Problem List   Diagnosis    PAD (peripheral artery disease)    Venous insufficiency of right leg    Venous insufficiency    Venous stasis ulcer of left lower leg with edema of left lower leg    Venous stasis ulcer of right lower leg with edema of right lower leg    Bilateral leg edema    Neck pain    Decreased ROM of intervertebral discs of cervical spine    Neck pain, chronic       Medication List with Changes/Refills   Current Medications    LEVOTHYROXINE (SYNTHROID) 88 MCG TABLET    TAKE 1 TABLET BY MOUTH ONCE DAILY    METHOCARBAMOL (ROBAXIN) 500 MG TAB    Take 1 tablet (500 mg total) by mouth every 8 (eight) hours as needed (spasms).    PYRIDOXINE, VITAMIN B6, (B-6) 100 MG TAB    Take 50 mg by mouth once daily.       Review of patient's allergies indicates:  No Known Allergies    Past Surgical History:   Procedure Laterality Date    SHOULDER ARTHROSCOPY  4/3/15    (L) scope w/arch decompression       Family History   Problem Relation Age of Onset    Diabetes Mother        Social History     Socioeconomic History    Marital status:    Tobacco Use    Smoking status: Never    Smokeless tobacco: Never   Substance and Sexual Activity    Alcohol use: No     Alcohol/week: 0.0 standard drinks    Drug use: No       Vitals:    11/10/22 0954   Weight: 81.2 kg (179 lb)   Height: 5' 8" (1.727 m)   PainSc: 0-No pain   PainLoc: Toe       Review of Systems   Constitutional:  Negative for chills and fever.   Respiratory:  Negative for shortness of " breath.    Cardiovascular:  Negative for chest pain, palpitations, orthopnea, claudication and leg swelling.   Gastrointestinal:  Negative for diarrhea, nausea and vomiting.   Musculoskeletal:  Negative for joint pain.   Skin:  Negative for rash.   Neurological:  Negative for dizziness, tingling, sensory change, focal weakness and weakness.   Psychiatric/Behavioral: Negative.           Objective:      PHYSICAL EXAM: Apperance: Alert and orient in no distress,well developed, and with good attention to grooming and body habits  Lower Extremity Exam   VASCULAR: Dorsalis pedis pulses 2/4 bilateral and Posterior Tibial pulses 2/4 bilateral. Capillary fill time <4 seconds bilateral. No edema observed bilateral. Varicosities absent bilateral. Skin temperature of the lower extremities is warm to warm, proximal to distal. Hair growth WNL bilateral.  DERMATOLOGICAL: No skin rash, subcutaneous nodules, lesions or ulcers observed. Left 5th nail bed observed to be clean with pink epithealized tissue noted. Minimal erythema noted. No purulent drainage, no odor, and no increased temperature observed to left 5th toe.   NEUROLOGICAL: Light touch, sharp-dull, proprioception all present and equal bilaterally.    MUSCULOSKELETAL: Muscle strength 5/5 for all foot inverters, everters, plantarflexors, and  dorsiflexors bilateral. No pain on palpation of left 5th toe proximal nail border.  Decreased ankle joint ROM bilateral.  Negative pain to palpation bilateral plantar medial tubercle. Negative pain on medial-lateral compression of the calcaneus.        Assessment:       ICD-10-CM ICD-9-CM   1. Ingrown toenail without infection  L60.0 703.0   2. Acquired equinus deformity of both feet  M21.6X1 736.72    M21.6X2        Plan:   Ingrown toenail without infection    Acquired equinus deformity of both feet    I counseled the patient on his conditions, regarding findings of my examination, my impressions, and usual treatment plan.   Patient to  continue local care until completely healed with no drainage and no redness.  Patient should call the clinic immediately if any signs of infection such as fever chills sweats increased redness or pain but was otherwise discharged.  I explained to the patient that etiology and treatment options for heel pain including rest,  ice messages, stretching exercises, strappings/tappings, NSAID's, injections, new shoegear with orthotic inserts, and/or surgical treatment.   Patient agreed to stretching exercises.   I gave written and verbal instructions on heel cord stretching and this was demonstrated for the patient. Patient expressed understanding.  Patient instructed on adequate icing techniques. Patient should ice the affected area at least once per day x 10 minutes for 10 days . I advised the  patient that extra icing would also be beneficial to ensure adequate anti inflammatory effect.   The patient and I reviewed the types of shoes he should be wearing, my recommendation includes generally the best time of the day for a shoe fitting is the afternoon, shoes with a wide toe box, very good cushion, and tennis shoes with removable inner soles. The patient and I reviewed my recommendations for over-the-counter orthotic inserts.   Patient to return in as needed.          Bryon Lin DPM  [facility]

## 2023-12-12 ENCOUNTER — OFFICE VISIT (OUTPATIENT)
Dept: PODIATRY | Facility: CLINIC | Age: 73
End: 2023-12-12
Payer: MEDICARE

## 2023-12-12 VITALS — HEIGHT: 68 IN | WEIGHT: 179 LBS | BODY MASS INDEX: 27.13 KG/M2

## 2023-12-12 DIAGNOSIS — L60.1 ONYCHOLYSIS OF TOENAIL: Primary | ICD-10-CM

## 2023-12-12 DIAGNOSIS — G57.90 NEURITIS OF FOOT, UNSPECIFIED LATERALITY: ICD-10-CM

## 2023-12-12 DIAGNOSIS — I73.9 PAD (PERIPHERAL ARTERY DISEASE): ICD-10-CM

## 2023-12-12 PROCEDURE — 99999 PR PBB SHADOW E&M-EST. PATIENT-LVL III: CPT | Mod: PBBFAC,,, | Performed by: PODIATRIST

## 2023-12-12 PROCEDURE — 99213 OFFICE O/P EST LOW 20 MIN: CPT | Mod: PBBFAC | Performed by: PODIATRIST

## 2023-12-12 PROCEDURE — 99999 PR PBB SHADOW E&M-EST. PATIENT-LVL III: ICD-10-PCS | Mod: PBBFAC,,, | Performed by: PODIATRIST

## 2023-12-12 PROCEDURE — 99214 OFFICE O/P EST MOD 30 MIN: CPT | Mod: 25,S$PBB,, | Performed by: PODIATRIST

## 2023-12-12 PROCEDURE — 99214 PR OFFICE/OUTPT VISIT, EST, LEVL IV, 30-39 MIN: ICD-10-PCS | Mod: 25,S$PBB,, | Performed by: PODIATRIST

## 2023-12-12 RX ORDER — GABAPENTIN 300 MG/1
300 CAPSULE ORAL NIGHTLY
Qty: 30 CAPSULE | Refills: 0 | Status: SHIPPED | OUTPATIENT
Start: 2023-12-12

## 2023-12-24 NOTE — PROGRESS NOTES
Subjective:     Patient ID: Jhon Garvey Sr is a 73 y.o. male.    Chief Complaint: Nail Problem (C/o states right hallux nail fell off, no pain, wearing boots, BL tingling in ankles.  (non-diabetic pt, last seen PCP Dr. Williamson 7/06/23))    Jhon is a 73 y.o. male who presents to the clinic complaining of right big toenail check. Patient states toenail feel off, denies pain. Patient does admit history of trauma to the toe. Patient also complains of tinging in both ankle.  Jhon is inquiring about treatment options.    Patient Active Problem List   Diagnosis    PAD (peripheral artery disease)    Venous insufficiency of right leg    Venous insufficiency    Venous stasis ulcer of left lower leg with edema of left lower leg    Venous stasis ulcer of right lower leg with edema of right lower leg    Bilateral leg edema    Neck pain    Decreased ROM of intervertebral discs of cervical spine    Neck pain, chronic       Medication List with Changes/Refills   New Medications    GABAPENTIN (NEURONTIN) 300 MG CAPSULE    Take 1 capsule (300 mg total) by mouth every evening.   Current Medications    LEVOTHYROXINE (SYNTHROID) 88 MCG TABLET    TAKE 1 TABLET BY MOUTH ONCE DAILY    METHOCARBAMOL (ROBAXIN) 500 MG TAB    Take 1 tablet (500 mg total) by mouth every 8 (eight) hours as needed (spasms).    PYRIDOXINE, VITAMIN B6, (B-6) 100 MG TAB    Take 50 mg by mouth once daily.       Review of patient's allergies indicates:  No Known Allergies    Past Surgical History:   Procedure Laterality Date    SHOULDER ARTHROSCOPY  4/3/15    (L) scope w/arch decompression       Family History   Problem Relation Age of Onset    Diabetes Mother        Social History     Socioeconomic History    Marital status:    Tobacco Use    Smoking status: Never    Smokeless tobacco: Never   Substance and Sexual Activity    Alcohol use: No     Alcohol/week: 0.0 standard drinks of alcohol    Drug use: No       Vitals:    12/12/23 1321   Weight: 81.2 kg (179 lb  "0.2 oz)   Height: 5' 8" (1.727 m)   PainSc: 0-No pain         Review of Systems   Constitutional:  Negative for chills and fever.   Respiratory:  Negative for shortness of breath.    Cardiovascular:  Negative for chest pain, palpitations, orthopnea, claudication and leg swelling.   Gastrointestinal:  Negative for diarrhea, nausea and vomiting.   Musculoskeletal:  Negative for joint pain.   Skin:  Negative for rash.   Neurological:  Positive for tingling and sensory change.   Psychiatric/Behavioral: Negative.               Objective:      PHYSICAL EXAM: Apperance: Alert and orient in no distress,well developed, and with good attention to grooming and body habits  Lower Extremity Exam   VASCULAR: Dorsalis pedis pulses 2/4 bilateral and Posterior Tibial pulses 2/4 bilateral Capillary fill time <blanched bilateral. No edema observed bilateral . Varicosities absent bilateral  Skin temperature of the lower extremities is warm to warm, proximal to distal. Hair growth dim bilateral   DERMATOLOGICAL: No skin rash, subcutaneous nodules, lesions or ulcers observed. Right hallux nail absent with clean epithealized nail bed noted. No purulent drainage, no odor, and no increased temperature observed to right hallux.     NEUROLOGICAL: Light touch, sharp-dull, proprioception all present and equal bilaterally. Vibratory sensation diminished at bilateral hallux and navicular. Protective sensation intact at all 10 sites as tested with a [place]-Barbara 5.07 monofilament.   MUSCULOSKELETAL: Muscle strength 5/5 for all foot inverters, everters, plantarflexors, and  dorsiflexors bilateral. Pain on palpation of left 5th medial toe nail border. Pain on palpation of dorsal nail plate left 5th toe. Decreased ankle joint ROM bilateral. Positive pain to palpation bilateral plantar medial tubercle. Negative pain on medial-lateral compression of the calcaneus.          Assessment:       ICD-10-CM ICD-9-CM   1. Onycholysis of toenail - Right " Foot  L60.1 703.8   2. Neuritis of foot, unspecified laterality  G57.90 355.8   3. PAD (peripheral artery disease)  I73.9 443.9       Plan:   Onycholysis of toenail - Right Foot    Neuritis of foot, unspecified laterality  -     gabapentin (NEURONTIN) 300 MG capsule; Take 1 capsule (300 mg total) by mouth every evening.  Dispense: 30 capsule; Refill: 0    PAD (peripheral artery disease)    I counseled the patient on his conditions, regarding findings of my examination, my impressions, and usual treatment plan.   Discussed with patient the etiologies and treatments for avulsed toenails. Counseled patient on the need to closely monitor any grown or changes the the area of the nail. Patient states they understand.   Discussed with patient treatments for neuropathy consisting of topical or oral medication.  Prescription written for Gabapentin 300mg to be taken once nightly. Informed patient of possible side effects including but not limited to disorientation and drowsiness. Patient instructed to discontinue use if there are any adverse effects. Patient states he understands.   Recommendations given for over-the-counter medicine such as Two Old Goats.  Counseled patient on daily foot inspections and proper shoe gear.  Patient to return in 4-6 weeks or sooner if needed.          Bryon Lin DPM  [facility]

## 2024-01-09 ENCOUNTER — OFFICE VISIT (OUTPATIENT)
Dept: PODIATRY | Facility: CLINIC | Age: 74
End: 2024-01-09
Payer: MEDICARE

## 2024-01-09 VITALS — WEIGHT: 179 LBS | BODY MASS INDEX: 27.13 KG/M2 | HEIGHT: 68 IN

## 2024-01-09 DIAGNOSIS — G57.90 NEURITIS OF FOOT, UNSPECIFIED LATERALITY: ICD-10-CM

## 2024-01-09 DIAGNOSIS — I73.9 PAD (PERIPHERAL ARTERY DISEASE): Primary | ICD-10-CM

## 2024-01-09 PROCEDURE — 99213 OFFICE O/P EST LOW 20 MIN: CPT | Mod: S$PBB,,, | Performed by: PODIATRIST

## 2024-01-09 PROCEDURE — 99999 PR PBB SHADOW E&M-EST. PATIENT-LVL II: CPT | Mod: PBBFAC,,, | Performed by: PODIATRIST

## 2024-01-09 PROCEDURE — 99212 OFFICE O/P EST SF 10 MIN: CPT | Mod: PBBFAC | Performed by: PODIATRIST

## 2024-01-09 NOTE — PROGRESS NOTES
"Subjective:     Patient ID: Jhon Garvey Sr is a 73 y.o. male.    Chief Complaint: Follow-up (Follow up BL foot & ankle /tingling, no pain, wearing boots, non-diabetic pt, last seen PCP Dr. Williamson 7/6/23. )    Jhon is a 73 y.o. male who presents to the clinic for follow up of bilateral foot tingling. Patient states he took the medication as directed, with Jhon is inquiring about treatment options.    Patient Active Problem List   Diagnosis    PAD (peripheral artery disease)    Venous insufficiency of right leg    Venous insufficiency    Venous stasis ulcer of left lower leg with edema of left lower leg    Venous stasis ulcer of right lower leg with edema of right lower leg    Bilateral leg edema    Neck pain    Decreased ROM of intervertebral discs of cervical spine    Neck pain, chronic       Medication List with Changes/Refills   Current Medications    GABAPENTIN (NEURONTIN) 300 MG CAPSULE    Take 1 capsule (300 mg total) by mouth every evening.    LEVOTHYROXINE (SYNTHROID) 88 MCG TABLET    TAKE 1 TABLET BY MOUTH ONCE DAILY    METHOCARBAMOL (ROBAXIN) 500 MG TAB    Take 1 tablet (500 mg total) by mouth every 8 (eight) hours as needed (spasms).    PYRIDOXINE, VITAMIN B6, (B-6) 100 MG TAB    Take 50 mg by mouth once daily.       Review of patient's allergies indicates:  No Known Allergies    Past Surgical History:   Procedure Laterality Date    SHOULDER ARTHROSCOPY  4/3/15    (L) scope w/arch decompression       Family History   Problem Relation Age of Onset    Diabetes Mother        Social History     Socioeconomic History    Marital status:    Tobacco Use    Smoking status: Never    Smokeless tobacco: Never   Substance and Sexual Activity    Alcohol use: No     Alcohol/week: 0.0 standard drinks of alcohol    Drug use: No       Vitals:    01/09/24 1000   Weight: 81.2 kg (179 lb 0.2 oz)   Height: 5' 8" (1.727 m)         Review of Systems   Constitutional:  Negative for chills and fever.   Respiratory:  Negative " for shortness of breath.    Cardiovascular:  Negative for chest pain, palpitations, orthopnea, claudication and leg swelling.   Gastrointestinal:  Negative for diarrhea, nausea and vomiting.   Musculoskeletal:  Negative for joint pain.   Skin:  Negative for rash.   Neurological:  Positive for tingling and sensory change.   Psychiatric/Behavioral: Negative.               Objective:      PHYSICAL EXAM: Apperance: Alert and orient in no distress,well developed, and with good attention to grooming and body habits  Lower Extremity Exam   VASCULAR: Dorsalis pedis pulses 2/4 bilateral and Posterior Tibial pulses 2/4 bilateral Capillary fill time <blanched bilateral. No edema observed bilateral . Varicosities absent bilateral  Skin temperature of the lower extremities is warm to warm, proximal to distal. Hair growth dim bilateral   DERMATOLOGICAL: No skin rash, subcutaneous nodules, lesions or ulcers observed. Right hallux nail absent with clean epithealized nail bed noted. No purulent drainage, no odor, and no increased temperature observed to right hallux.     NEUROLOGICAL: Light touch, sharp-dull, proprioception all present and equal bilaterally. Vibratory sensation diminished at bilateral hallux and navicular. Protective sensation intact at all 10 sites as tested with a [place]-Barbara 5.07 monofilament.   MUSCULOSKELETAL: Muscle strength 5/5 for all foot inverters, everters, plantarflexors, and  dorsiflexors bilateral.         Assessment:       ICD-10-CM ICD-9-CM   1. PAD (peripheral artery disease)  I73.9 443.9   2. Neuritis of foot, unspecified laterality  G57.90 355.8         Plan:   PAD (peripheral artery disease)    Neuritis of foot, unspecified laterality    I counseled the patient on his conditions, regarding findings of my examination, my impressions, and usual treatment plan.   Discussed with patient treatments for PVD/neuropathy consisting of topical or oral medication.  Patient to continue Gabapentin 300mg  to be taken as needed.   Recommendations given for over-the-counter medicine such as Two Old Goats.  Counseled patient on daily foot inspections and proper shoe gear.  Patient to return in 4 months or sooner if needed.          Bryon Lin DPM  [facility]